# Patient Record
Sex: MALE | Race: WHITE | NOT HISPANIC OR LATINO
[De-identification: names, ages, dates, MRNs, and addresses within clinical notes are randomized per-mention and may not be internally consistent; named-entity substitution may affect disease eponyms.]

---

## 2017-01-17 ENCOUNTER — RX RENEWAL (OUTPATIENT)
Age: 82
End: 2017-01-17

## 2017-05-01 ENCOUNTER — APPOINTMENT (OUTPATIENT)
Dept: CARDIOLOGY | Facility: CLINIC | Age: 82
End: 2017-05-01

## 2017-05-01 ENCOUNTER — NON-APPOINTMENT (OUTPATIENT)
Age: 82
End: 2017-05-01

## 2017-05-01 VITALS — SYSTOLIC BLOOD PRESSURE: 125 MMHG | DIASTOLIC BLOOD PRESSURE: 85 MMHG

## 2017-05-01 VITALS
HEART RATE: 71 BPM | OXYGEN SATURATION: 95 % | TEMPERATURE: 98.1 F | SYSTOLIC BLOOD PRESSURE: 160 MMHG | DIASTOLIC BLOOD PRESSURE: 84 MMHG | BODY MASS INDEX: 23.98 KG/M2 | WEIGHT: 177 LBS | HEIGHT: 72 IN

## 2017-06-20 ENCOUNTER — RX RENEWAL (OUTPATIENT)
Age: 82
End: 2017-06-20

## 2017-09-13 ENCOUNTER — NON-APPOINTMENT (OUTPATIENT)
Age: 82
End: 2017-09-13

## 2017-09-13 ENCOUNTER — APPOINTMENT (OUTPATIENT)
Dept: CARDIOLOGY | Facility: CLINIC | Age: 82
End: 2017-09-13
Payer: MEDICARE

## 2017-09-13 VITALS
WEIGHT: 174 LBS | OXYGEN SATURATION: 97 % | SYSTOLIC BLOOD PRESSURE: 159 MMHG | DIASTOLIC BLOOD PRESSURE: 90 MMHG | HEART RATE: 74 BPM | BODY MASS INDEX: 23.6 KG/M2

## 2017-09-13 VITALS — DIASTOLIC BLOOD PRESSURE: 70 MMHG | SYSTOLIC BLOOD PRESSURE: 130 MMHG

## 2017-09-13 DIAGNOSIS — R53.1 WEAKNESS: ICD-10-CM

## 2017-09-13 PROCEDURE — 93000 ELECTROCARDIOGRAM COMPLETE: CPT

## 2017-09-13 PROCEDURE — 99214 OFFICE O/P EST MOD 30 MIN: CPT

## 2017-11-29 ENCOUNTER — NON-APPOINTMENT (OUTPATIENT)
Age: 82
End: 2017-11-29

## 2017-11-29 ENCOUNTER — APPOINTMENT (OUTPATIENT)
Dept: CARDIOLOGY | Facility: CLINIC | Age: 82
End: 2017-11-29
Payer: MEDICARE

## 2017-11-29 VITALS
WEIGHT: 176 LBS | OXYGEN SATURATION: 97 % | SYSTOLIC BLOOD PRESSURE: 166 MMHG | DIASTOLIC BLOOD PRESSURE: 78 MMHG | HEART RATE: 61 BPM | BODY MASS INDEX: 23.87 KG/M2

## 2017-11-29 DIAGNOSIS — N40.0 BENIGN PROSTATIC HYPERPLASIA WITHOUT LOWER URINARY TRACT SYMPMS: ICD-10-CM

## 2017-11-29 PROCEDURE — 99214 OFFICE O/P EST MOD 30 MIN: CPT

## 2017-11-29 PROCEDURE — 93000 ELECTROCARDIOGRAM COMPLETE: CPT

## 2017-12-08 ENCOUNTER — TRANSCRIPTION ENCOUNTER (OUTPATIENT)
Age: 82
End: 2017-12-08

## 2018-01-17 ENCOUNTER — RX RENEWAL (OUTPATIENT)
Age: 83
End: 2018-01-17

## 2018-05-20 ENCOUNTER — INPATIENT (INPATIENT)
Facility: HOSPITAL | Age: 83
LOS: 1 days | Discharge: HOME | DRG: 309 | End: 2018-05-22
Attending: INTERNAL MEDICINE | Admitting: INTERNAL MEDICINE
Payer: MEDICARE

## 2018-05-20 VITALS
OXYGEN SATURATION: 98 % | HEART RATE: 87 BPM | RESPIRATION RATE: 18 BRPM | DIASTOLIC BLOOD PRESSURE: 84 MMHG | SYSTOLIC BLOOD PRESSURE: 147 MMHG

## 2018-05-20 DIAGNOSIS — Z29.9 ENCOUNTER FOR PROPHYLACTIC MEASURES, UNSPECIFIED: ICD-10-CM

## 2018-05-20 DIAGNOSIS — R53.1 WEAKNESS: ICD-10-CM

## 2018-05-20 DIAGNOSIS — E87.2 ACIDOSIS: ICD-10-CM

## 2018-05-20 DIAGNOSIS — E83.39 OTHER DISORDERS OF PHOSPHORUS METABOLISM: ICD-10-CM

## 2018-05-20 DIAGNOSIS — I48.91 UNSPECIFIED ATRIAL FIBRILLATION: ICD-10-CM

## 2018-05-20 DIAGNOSIS — I25.10 ATHEROSCLEROTIC HEART DISEASE OF NATIVE CORONARY ARTERY WITHOUT ANGINA PECTORIS: ICD-10-CM

## 2018-05-20 DIAGNOSIS — E78.5 HYPERLIPIDEMIA, UNSPECIFIED: ICD-10-CM

## 2018-05-20 DIAGNOSIS — N40.0 BENIGN PROSTATIC HYPERPLASIA WITHOUT LOWER URINARY TRACT SYMPTOMS: ICD-10-CM

## 2018-05-20 LAB
ALBUMIN SERPL ELPH-MCNC: 3.7 G/DL — SIGNIFICANT CHANGE UP (ref 3.3–5)
ALP SERPL-CCNC: 115 U/L — SIGNIFICANT CHANGE UP (ref 40–120)
ALT FLD-CCNC: 25 U/L — SIGNIFICANT CHANGE UP (ref 10–45)
ANION GAP SERPL CALC-SCNC: 11 MMOL/L — SIGNIFICANT CHANGE UP (ref 5–17)
APTT BLD: 27.6 SEC — SIGNIFICANT CHANGE UP (ref 27.5–37.4)
AST SERPL-CCNC: 24 U/L — SIGNIFICANT CHANGE UP (ref 10–40)
BASE EXCESS BLDV CALC-SCNC: 0.4 MMOL/L — SIGNIFICANT CHANGE UP (ref -2–2)
BASOPHILS # BLD AUTO: 0 K/UL — SIGNIFICANT CHANGE UP (ref 0–0.2)
BASOPHILS NFR BLD AUTO: 0.5 % — SIGNIFICANT CHANGE UP (ref 0–2)
BILIRUB SERPL-MCNC: 0.3 MG/DL — SIGNIFICANT CHANGE UP (ref 0.2–1.2)
BUN SERPL-MCNC: 30 MG/DL — HIGH (ref 7–23)
CA-I SERPL-SCNC: 1.21 MMOL/L — SIGNIFICANT CHANGE UP (ref 1.12–1.3)
CALCIUM SERPL-MCNC: 8.9 MG/DL — SIGNIFICANT CHANGE UP (ref 8.4–10.5)
CHLORIDE BLDV-SCNC: 111 MMOL/L — HIGH (ref 96–108)
CHLORIDE SERPL-SCNC: 105 MMOL/L — SIGNIFICANT CHANGE UP (ref 96–108)
CK MB BLD-MCNC: 5.3 % — HIGH (ref 0–3.5)
CK MB CFR SERPL CALC: 5.7 NG/ML — SIGNIFICANT CHANGE UP (ref 0–6.7)
CK SERPL-CCNC: 107 U/L — SIGNIFICANT CHANGE UP (ref 30–200)
CO2 BLDV-SCNC: 26 MMOL/L — SIGNIFICANT CHANGE UP (ref 22–30)
CO2 SERPL-SCNC: 24 MMOL/L — SIGNIFICANT CHANGE UP (ref 22–31)
CREAT SERPL-MCNC: 1.26 MG/DL — SIGNIFICANT CHANGE UP (ref 0.5–1.3)
EOSINOPHIL # BLD AUTO: 0.2 K/UL — SIGNIFICANT CHANGE UP (ref 0–0.5)
EOSINOPHIL NFR BLD AUTO: 3.3 % — SIGNIFICANT CHANGE UP (ref 0–6)
GAS PNL BLDV: 139 MMOL/L — SIGNIFICANT CHANGE UP (ref 136–145)
GAS PNL BLDV: SIGNIFICANT CHANGE UP
GLUCOSE BLDV-MCNC: 205 MG/DL — HIGH (ref 70–99)
GLUCOSE SERPL-MCNC: 190 MG/DL — HIGH (ref 70–99)
HCO3 BLDV-SCNC: 25 MMOL/L — SIGNIFICANT CHANGE UP (ref 21–29)
HCT VFR BLD CALC: 49.7 % — SIGNIFICANT CHANGE UP (ref 39–50)
HCT VFR BLDA CALC: 46 % — SIGNIFICANT CHANGE UP (ref 39–50)
HGB BLD CALC-MCNC: 15.2 G/DL — SIGNIFICANT CHANGE UP (ref 13–17)
HGB BLD-MCNC: 15.9 G/DL — SIGNIFICANT CHANGE UP (ref 13–17)
INR BLD: 1.04 RATIO — SIGNIFICANT CHANGE UP (ref 0.88–1.16)
LACTATE BLDV-MCNC: 4.4 MMOL/L — CRITICAL HIGH (ref 0.7–2)
LYMPHOCYTES # BLD AUTO: 1.8 K/UL — SIGNIFICANT CHANGE UP (ref 1–3.3)
LYMPHOCYTES # BLD AUTO: 35.5 % — SIGNIFICANT CHANGE UP (ref 13–44)
MAGNESIUM SERPL-MCNC: 2.2 MG/DL — SIGNIFICANT CHANGE UP (ref 1.6–2.6)
MCHC RBC-ENTMCNC: 29.6 PG — SIGNIFICANT CHANGE UP (ref 27–34)
MCHC RBC-ENTMCNC: 31.9 GM/DL — LOW (ref 32–36)
MCV RBC AUTO: 92.7 FL — SIGNIFICANT CHANGE UP (ref 80–100)
MONOCYTES # BLD AUTO: 0.4 K/UL — SIGNIFICANT CHANGE UP (ref 0–0.9)
MONOCYTES NFR BLD AUTO: 7 % — SIGNIFICANT CHANGE UP (ref 2–14)
NEUTROPHILS # BLD AUTO: 2.8 K/UL — SIGNIFICANT CHANGE UP (ref 1.8–7.4)
NEUTROPHILS NFR BLD AUTO: 53.6 % — SIGNIFICANT CHANGE UP (ref 43–77)
OTHER CELLS CSF MANUAL: 9 ML/DL — LOW (ref 18–22)
PCO2 BLDV: 42 MMHG — SIGNIFICANT CHANGE UP (ref 35–50)
PH BLDV: 7.39 — SIGNIFICANT CHANGE UP (ref 7.35–7.45)
PHOSPHATE SERPL-MCNC: 2.1 MG/DL — LOW (ref 2.5–4.5)
PLATELET # BLD AUTO: 87 K/UL — LOW (ref 150–400)
PO2 BLDV: 25 MMHG — SIGNIFICANT CHANGE UP (ref 25–45)
POTASSIUM BLDV-SCNC: 4.3 MMOL/L — SIGNIFICANT CHANGE UP (ref 3.5–5)
POTASSIUM SERPL-MCNC: 4.3 MMOL/L — SIGNIFICANT CHANGE UP (ref 3.5–5.3)
POTASSIUM SERPL-SCNC: 4.3 MMOL/L — SIGNIFICANT CHANGE UP (ref 3.5–5.3)
PROT SERPL-MCNC: 6.2 G/DL — SIGNIFICANT CHANGE UP (ref 6–8.3)
PROTHROM AB SERPL-ACNC: 11.2 SEC — SIGNIFICANT CHANGE UP (ref 9.8–12.7)
RBC # BLD: 5.36 M/UL — SIGNIFICANT CHANGE UP (ref 4.2–5.8)
RBC # FLD: 12.3 % — SIGNIFICANT CHANGE UP (ref 10.3–14.5)
SAO2 % BLDV: 44 % — LOW (ref 67–88)
SODIUM SERPL-SCNC: 140 MMOL/L — SIGNIFICANT CHANGE UP (ref 135–145)
TROPONIN T SERPL-MCNC: <0.01 NG/ML — SIGNIFICANT CHANGE UP (ref 0–0.06)
TROPONIN T SERPL-MCNC: <0.01 NG/ML — SIGNIFICANT CHANGE UP (ref 0–0.06)
TSH SERPL-MCNC: 7 UIU/ML — HIGH (ref 0.27–4.2)
WBC # BLD: 5.2 K/UL — SIGNIFICANT CHANGE UP (ref 3.8–10.5)
WBC # FLD AUTO: 5.2 K/UL — SIGNIFICANT CHANGE UP (ref 3.8–10.5)

## 2018-05-20 PROCEDURE — 99285 EMERGENCY DEPT VISIT HI MDM: CPT | Mod: 25,GC

## 2018-05-20 PROCEDURE — 99223 1ST HOSP IP/OBS HIGH 75: CPT

## 2018-05-20 PROCEDURE — 71045 X-RAY EXAM CHEST 1 VIEW: CPT | Mod: 26

## 2018-05-20 PROCEDURE — 93010 ELECTROCARDIOGRAM REPORT: CPT

## 2018-05-20 RX ORDER — SODIUM,POTASSIUM PHOSPHATES 278-250MG
1 POWDER IN PACKET (EA) ORAL THREE TIMES A DAY
Qty: 0 | Refills: 0 | Status: DISCONTINUED | OUTPATIENT
Start: 2018-05-20 | End: 2018-05-20

## 2018-05-20 RX ORDER — SODIUM CHLORIDE 9 MG/ML
1000 INJECTION INTRAMUSCULAR; INTRAVENOUS; SUBCUTANEOUS ONCE
Qty: 0 | Refills: 0 | Status: COMPLETED | OUTPATIENT
Start: 2018-05-20 | End: 2018-05-20

## 2018-05-20 RX ORDER — SODIUM,POTASSIUM PHOSPHATES 278-250MG
1 POWDER IN PACKET (EA) ORAL
Qty: 0 | Refills: 0 | Status: COMPLETED | OUTPATIENT
Start: 2018-05-20 | End: 2018-05-20

## 2018-05-20 RX ORDER — CHOLECALCIFEROL (VITAMIN D3) 125 MCG
1000 CAPSULE ORAL DAILY
Qty: 0 | Refills: 0 | Status: DISCONTINUED | OUTPATIENT
Start: 2018-05-20 | End: 2018-05-22

## 2018-05-20 RX ORDER — APIXABAN 2.5 MG/1
5 TABLET, FILM COATED ORAL EVERY 12 HOURS
Qty: 0 | Refills: 0 | Status: DISCONTINUED | OUTPATIENT
Start: 2018-05-20 | End: 2018-05-22

## 2018-05-20 RX ORDER — TAMSULOSIN HYDROCHLORIDE 0.4 MG/1
1 CAPSULE ORAL
Qty: 0 | Refills: 0 | COMMUNITY

## 2018-05-20 RX ORDER — ASCORBIC ACID 60 MG
1 TABLET,CHEWABLE ORAL
Qty: 0 | Refills: 0 | COMMUNITY

## 2018-05-20 RX ORDER — METOPROLOL TARTRATE 50 MG
25 TABLET ORAL
Qty: 0 | Refills: 0 | Status: DISCONTINUED | OUTPATIENT
Start: 2018-05-20 | End: 2018-05-22

## 2018-05-20 RX ORDER — ATORVASTATIN CALCIUM 80 MG/1
10 TABLET, FILM COATED ORAL AT BEDTIME
Qty: 0 | Refills: 0 | Status: DISCONTINUED | OUTPATIENT
Start: 2018-05-20 | End: 2018-05-22

## 2018-05-20 RX ORDER — METOPROLOL TARTRATE 50 MG
5 TABLET ORAL ONCE
Qty: 0 | Refills: 0 | Status: COMPLETED | OUTPATIENT
Start: 2018-05-20 | End: 2018-05-20

## 2018-05-20 RX ORDER — ATORVASTATIN CALCIUM 80 MG/1
1 TABLET, FILM COATED ORAL
Qty: 0 | Refills: 0 | COMMUNITY

## 2018-05-20 RX ORDER — CHOLECALCIFEROL (VITAMIN D3) 125 MCG
1 CAPSULE ORAL
Qty: 0 | Refills: 0 | COMMUNITY

## 2018-05-20 RX ORDER — SODIUM CHLORIDE 9 MG/ML
500 INJECTION INTRAMUSCULAR; INTRAVENOUS; SUBCUTANEOUS ONCE
Qty: 0 | Refills: 0 | Status: COMPLETED | OUTPATIENT
Start: 2018-05-20 | End: 2018-05-20

## 2018-05-20 RX ORDER — APIXABAN 2.5 MG/1
2.5 TABLET, FILM COATED ORAL EVERY 12 HOURS
Qty: 0 | Refills: 0 | Status: DISCONTINUED | OUTPATIENT
Start: 2018-05-20 | End: 2018-05-20

## 2018-05-20 RX ORDER — TAMSULOSIN HYDROCHLORIDE 0.4 MG/1
0.4 CAPSULE ORAL DAILY
Qty: 0 | Refills: 0 | Status: DISCONTINUED | OUTPATIENT
Start: 2018-05-20 | End: 2018-05-22

## 2018-05-20 RX ORDER — METOPROLOL TARTRATE 50 MG
25 TABLET ORAL
Qty: 0 | Refills: 0 | Status: DISCONTINUED | OUTPATIENT
Start: 2018-05-20 | End: 2018-05-20

## 2018-05-20 RX ORDER — ASPIRIN/CALCIUM CARB/MAGNESIUM 324 MG
81 TABLET ORAL DAILY
Qty: 0 | Refills: 0 | Status: DISCONTINUED | OUTPATIENT
Start: 2018-05-20 | End: 2018-05-21

## 2018-05-20 RX ADMIN — Medication 1000 UNIT(S): at 13:40

## 2018-05-20 RX ADMIN — SODIUM CHLORIDE 1000 MILLILITER(S): 9 INJECTION INTRAMUSCULAR; INTRAVENOUS; SUBCUTANEOUS at 07:49

## 2018-05-20 RX ADMIN — SODIUM CHLORIDE 1000 MILLILITER(S): 9 INJECTION INTRAMUSCULAR; INTRAVENOUS; SUBCUTANEOUS at 08:53

## 2018-05-20 RX ADMIN — Medication 5 MILLIGRAM(S): at 09:24

## 2018-05-20 RX ADMIN — Medication 81 MILLIGRAM(S): at 13:39

## 2018-05-20 RX ADMIN — APIXABAN 5 MILLIGRAM(S): 2.5 TABLET, FILM COATED ORAL at 17:41

## 2018-05-20 RX ADMIN — Medication 1 TABLET(S): at 15:35

## 2018-05-20 RX ADMIN — Medication 1 TABLET(S): at 13:39

## 2018-05-20 RX ADMIN — Medication 25 MILLIGRAM(S): at 15:36

## 2018-05-20 RX ADMIN — TAMSULOSIN HYDROCHLORIDE 0.4 MILLIGRAM(S): 0.4 CAPSULE ORAL at 13:40

## 2018-05-20 RX ADMIN — Medication 1 TABLET(S): at 17:41

## 2018-05-20 RX ADMIN — APIXABAN 2.5 MILLIGRAM(S): 2.5 TABLET, FILM COATED ORAL at 09:33

## 2018-05-20 RX ADMIN — ATORVASTATIN CALCIUM 10 MILLIGRAM(S): 80 TABLET, FILM COATED ORAL at 21:04

## 2018-05-20 NOTE — ED PROVIDER NOTE - PHYSICAL EXAMINATION
Physical Exam: aletha ROJO who is in NAD, AAOx3, NCAT, + dry mucosal membranes, neck is supple, PERRL, CTAB, + tachycardic (mild) and irregular rhythm, abdomen is soft and NTND, No edema, No deformity of extremities, No rashes, CN grossly intact, No focal motor or sensory deficits. Rectal (chaperone Miriam Herron RN) was brown soft but formed stool that was hemoccult negative. ~ Dante Azul MD

## 2018-05-20 NOTE — ED PROVIDER NOTE - PSH
Basal cell cancer  excised from face 5/12  Cataract  bilateral lens implant 1/12  H/O angioplasty  1 stent placed 2002  H/O hernia repair  1998  History of hip replacement, total  right 2008  S/P appendectomy    S/P arthroscopy  left knee 1998

## 2018-05-20 NOTE — ED PROVIDER NOTE - NS ED ROS FT
No fever, no chills, no change in vision, no change in hearing, no chest pain, no shortness of breath, no abdominal pain, no vomiting, no dysuria, no muscle pain, no rashes, no loss of consciousness, + gen weakness. ~ Dante Azul MD

## 2018-05-20 NOTE — H&P ADULT - PROBLEM SELECTOR PLAN 4
lactate 4.4 likely due to transient hypoperfusion during Afib with RVR  s/p 1.5L IVF by ER  repeat lactate now lactate 4.4 likely due to transient hypoperfusion during Afib with RVR  s/p 1.5L IVF by ER  repeat lactate now, if still elevated additional IVF can be considered

## 2018-05-20 NOTE — H&P ADULT - NEGATIVE CARDIOVASCULAR SYMPTOMS
no palpitations/no peripheral edema/no chest pain/no dyspnea on exertion/no paroxysmal nocturnal dyspnea

## 2018-05-20 NOTE — ED PROVIDER NOTE - OBJECTIVE STATEMENT
Dante Azul MD (resident): 89 M w/ Hx of CAD s/p PCI 2002, paroxysmal AF and PE 2/2 L TKA 2 yrs ago (no longer on coumadin), HTN, BPH, who was BIBEMS for generalized weakness. The patient started to feel "unwell" approximately at 6 am when he woke up, able to walk to bathroom and urinate and have a BM. Pt felt worsening weakness and malaise after having a second soft stool (nonbloody, no dark tarry stool). Improved after he drank OJ and ate sandwich. Pt had check up 2 wks ago, routine physical, felt normal, but had "irregularly irregular rhythm" and was supposed to f/u w/ cardiologist. No CP, SOB, lightheadedness, presyncope.

## 2018-05-20 NOTE — ED ADULT TRIAGE NOTE - ADDITIONAL SAFETY/BANDS...
----- Message from Bharathi Pope DO sent at 12/1/2017  1:12 PM EST -----  Please inform the blood testing revealed no abnoramlities. She is going to be discharged back to her PCP for treatment of her Underlying fibromyalgia. Recommendations for the hip and back pain have been provided to her PCP. Additional Safety/Bands:

## 2018-05-20 NOTE — H&P ADULT - PROBLEM SELECTOR PLAN 2
-had Afib with RVR on EKG and on tele, s/p IV metoprolol 5mg IV by ER  -100-120s during my exam but asymptomatic, stable BP  -on metoprolol tartrate 25mg once daily at home, will increase to 25mg bid for now, may need uptitration to 50mg bid  -monitor on tele  -[id number] score is 2 by age only. Extensive Discussion regarding R/B/A of anticoagulation with patient and female  bedside. Denies any history of bleeding, remote history of 1 fall in past year otherwise no history of falls and ambulates with walker. Explained to them risk of bleeding is higher as patient is on baby aspirin for CAD and if he incurs a fall or trauma to head, life threatening irreversible intracranial bleeding is possible, also spontaneous internal bleeding also a risk vs not being on a/c and having a higher risk of CVA. Other r/b/a were also discussed not listed here. Dr mcgrath instructed ER to start eliquis 2.5mg bid, I confirmed with Patient and  who agreed to anticoagulation with eliquis at this time and understood all r/b/a and wanted to proceed.  -TTE   -cardio consult with Dr Valenzuela -had Afib with RVR on EKG and on tele, s/p IV metoprolol 5mg IV by ER  -100-120s during my exam but asymptomatic, stable BP  -on metoprolol tartrate 25mg once daily at home, will increase to 25mg bid for now, may need uptitration to 50mg bid  -monitor on tele  -[id number] score is 2 by age only. Extensive Discussion regarding R/B/A of anticoagulation with patient and female  bedside. Denies any history of bleeding, remote history of 1 fall in past year otherwise no history of falls and ambulates with walker. Explained to them risk of bleeding is higher as patient is on baby aspirin for CAD and if he incurs a fall or trauma to head, life threatening irreversible intracranial bleeding is possible, also spontaneous internal bleeding also a risk vs not being on a/c and having a higher risk of CVA. Other r/b/a were also discussed not listed here. Dr mcgrath instructed ER to start eliquis 2.5mg bid, I confirmed with Patient and  who agreed to anticoagulation with eliquis at this time and understood all r/b/a and wanted to proceed.  -TTE   -cardio consult with Dr Woods -had Afib with RVR on EKG and on tele, s/p IV metoprolol 5mg IV by ER  -100-120s during my exam but asymptomatic, stable BP  -on metoprolol tartrate 25mg once daily at home, will increase to 25mg bid for now, may need uptitration to 50mg bid  -monitor on tele  -[id number]-VUDa score is 2 by age only. Extensive Discussion regarding R/B/A of anticoagulation with patient and female  bedside. Denies any history of bleeding, remote history of 1 fall in past year otherwise no history of falls and ambulates with walker. Explained to them risk of bleeding is higher as patient is on baby aspirin for CAD and if he incurs a fall or trauma to head, life threatening irreversible intracranial bleeding is possible, also spontaneous internal bleeding also a risk vs not being on a/c and having a higher risk of CVA. Other r/b/a were also discussed not listed here. Dr mcgrath instructed ER to start eliquis 2.5mg bid, I confirmed with Patient and  who agreed to anticoagulation with eliquis at this time and understood all r/b/a and wanted to proceed.  -TTE   -tsh pending  -cardio consult with Dr Woods, discussed with Dr Woods agrees with starting a/c will see patient molly

## 2018-05-20 NOTE — H&P ADULT - NSHPATTENDINGPLANDISCUSS_GEN_ALL_CORE
ER NP Ian and female " at bedside". All further care presumed by Dr Johnston. ER VANCE Sosa and female "" at bedside. All further care presumed by Dr Johnston.

## 2018-05-20 NOTE — H&P ADULT - PROBLEM SELECTOR PLAN 1
generalized weakness in setting of Afib with RVR now completely resolved per patient. Unlikely ACS, No s/s of infectious process, lactate likely elevated due to transient hypoperfusion in setting of RVR.  -will r/o ACS, 1st trop negative, no acute ischemic EKG changes, trend cardiac enzymes  -treat Afib with RVR generalized weakness in setting of Afib with RVR now completely resolved per patient. Unlikely ACS, No s/s of infectious process, lactate likely elevated due to transient hypoperfusion in setting of RVR.  -will r/o ACS, 1st trop negative, no acute ischemic EKG changes, trend cardiac enzymes  -treat Afib with RVR as below

## 2018-05-20 NOTE — ED ADULT NURSE NOTE - OBJECTIVE STATEMENT
89 year old male presents to the ED complaining of weakness. As per patient he had to go to the bathroom this morning and 'I was so weak I couldn't get up off the toilet. Pt has hx of afib 2 years ago following knee surgery. Pt was on coumadin and taken off when the afib resolved. As per patient he saw his MD last week and was told he had a 'irregular rhythm' and he needed to follow up with his cardiologist. Pt has soft stool this morning, incontinence care provided. Pt states that his weakness resolved following some juice and a sandwich. Ekg completed and given to attending, Pt placed on CM showing afib. Pt is A&O x 4, VSS with elevated HR, afebrile, ambulating independently. Pt denies fever, chills, NVD, SOB, or chest pain. Pt skin CDI. Pt has left sided leg swelling, as per patient, 'I've had that for years'.

## 2018-05-20 NOTE — H&P ADULT - ASSESSMENT
89 M w/ Hx of CAD s/p PCI 2002, paroxysmal AF not on a/c,  provoked PE s/p L TKR  (no longer on coumadin), HTN, BPH, who was BIBEMS for generalized weakness found to be in Afib with RVR also with lactic acidosis

## 2018-05-20 NOTE — H&P ADULT - HISTORY OF PRESENT ILLNESS
89 M w/ Hx of CAD s/p PCI 2002, paroxysmal AF and provoked PE 2/2 L TKA 2 yrs ago (no longer on coumadin), HTN, BPH, who was BIBEMS for generalized weakness.  patient started to feel "unwell" approximately at 6 am when he woke up, able to walk to bathroom and urinate and have a BM. Pt felt worsening weakness and malaise after having a second normal BM, no melena or hematochezia. Denies any abd pain, n/v, chest pain,.      ED vitals: 97.1, HR 73- 133, /83, 16, 100% on RA  He was given metoprolol 5mg IVP and NS 1.5L bolus in ER 89 M w/ Hx of CAD s/p PCI 2002, paroxysmal AF not on a/c,  provoked PE s/p L TKR  (no longer on coumadin), HTN, BPH, who was BIBEMS for generalized weakness. This AM around 6AM while in the bathroom patient started to feel very weak. He had 2 soft BMs, no melena or hematochezia. He then could not get up from toilet because he felt so weak and flushed. Denied any chest pain, sob, palpitations, syncope, lightheadedness n/v. Currently weakness has completely resolved and was never focal. Denies any f/c/cough/dysuria, URI symptoms. Saw his PMD 2 weeks ago and was told he has an irregular heart beat so was told to see his cardiologist which he had an appointment for this Monday. Denies any history of bleeding including GI bleed. Reports 1 fall in past year but usually does not have falls and ambulates with a cane.    ED vitals: 97.1, HR 73- 133, /83, 16, 100% on RA  He was given metoprolol 5mg IVP and NS 1.5L bolus in ER

## 2018-05-20 NOTE — ED PROVIDER NOTE - PROGRESS NOTE DETAILS
Dante Azul MD (resident): endorsed to Dr. Johnston, who requested Eliquis for A/C. 2.5 mg dose was chosen due to pt's age. Chadsvasc of 4.

## 2018-05-20 NOTE — ED PROVIDER NOTE - PMH
BPH (Benign Prostatic Hyperplasia)    CAD (coronary artery disease)  MI 2002  H/O spinal stenosis    Hyperlipidemia    Urinary retention  to ER for catheriztion 2009

## 2018-05-20 NOTE — ED PROVIDER NOTE - MEDICAL DECISION MAKING DETAILS
Dante Azul MD (resident): gen weakness w/ associated AFib (hx of afib, on metoprolol 25 mg daily). no associated anginal symptoms. will screen for electrolyte abnormalities, ACS. though pt w/ Hx of PE, no CP or SOB, no hypoxia, not likely due to PE. rectal exam negative for blood. Will trial IVF hydration for MMM and mild dehydration prior to rate controlling pt. 89 y old m  gen weakness w/ associated AFib (hx of afib, on metoprolol 25 mg daily). no associated anginal symptoms. feels better since came to ER , NO chest pain ,couth or fever ,rectal neg for blood   will screen for electrolyte abnormalities, ACS. though pt w/ Hx of PE, no CP or SOB, no hypoxia, not likely due to PE.  . Will trial IVF hydration for MMM and mild dehydration prior to rate controlling pt.ZR

## 2018-05-21 ENCOUNTER — APPOINTMENT (OUTPATIENT)
Dept: CARDIOLOGY | Facility: CLINIC | Age: 83
End: 2018-05-21

## 2018-05-21 LAB
ANION GAP SERPL CALC-SCNC: 9 MMOL/L — SIGNIFICANT CHANGE UP (ref 5–17)
BUN SERPL-MCNC: 28 MG/DL — HIGH (ref 7–23)
CALCIUM SERPL-MCNC: 8.6 MG/DL — SIGNIFICANT CHANGE UP (ref 8.4–10.5)
CHLORIDE SERPL-SCNC: 109 MMOL/L — HIGH (ref 96–108)
CO2 SERPL-SCNC: 25 MMOL/L — SIGNIFICANT CHANGE UP (ref 22–31)
CREAT SERPL-MCNC: 1.18 MG/DL — SIGNIFICANT CHANGE UP (ref 0.5–1.3)
GLUCOSE SERPL-MCNC: 103 MG/DL — HIGH (ref 70–99)
HCT VFR BLD CALC: 47.8 % — SIGNIFICANT CHANGE UP (ref 39–50)
HGB BLD-MCNC: 15.4 G/DL — SIGNIFICANT CHANGE UP (ref 13–17)
MAGNESIUM SERPL-MCNC: 2.2 MG/DL — SIGNIFICANT CHANGE UP (ref 1.6–2.6)
MCHC RBC-ENTMCNC: 29.3 PG — SIGNIFICANT CHANGE UP (ref 27–34)
MCHC RBC-ENTMCNC: 32.2 GM/DL — SIGNIFICANT CHANGE UP (ref 32–36)
MCV RBC AUTO: 90.9 FL — SIGNIFICANT CHANGE UP (ref 80–100)
PHOSPHATE SERPL-MCNC: 2.9 MG/DL — SIGNIFICANT CHANGE UP (ref 2.5–4.5)
PLATELET # BLD AUTO: 100 K/UL — LOW (ref 150–400)
POTASSIUM SERPL-MCNC: 4.1 MMOL/L — SIGNIFICANT CHANGE UP (ref 3.5–5.3)
POTASSIUM SERPL-SCNC: 4.1 MMOL/L — SIGNIFICANT CHANGE UP (ref 3.5–5.3)
RBC # BLD: 5.26 M/UL — SIGNIFICANT CHANGE UP (ref 4.2–5.8)
RBC # FLD: 13.8 % — SIGNIFICANT CHANGE UP (ref 10.3–14.5)
SODIUM SERPL-SCNC: 143 MMOL/L — SIGNIFICANT CHANGE UP (ref 135–145)
T3FREE SERPL-MCNC: 2.75 PG/ML — SIGNIFICANT CHANGE UP (ref 1.8–4.6)
T4 FREE SERPL-MCNC: 1.1 NG/DL — SIGNIFICANT CHANGE UP (ref 0.9–1.8)
WBC # BLD: 6.21 K/UL — SIGNIFICANT CHANGE UP (ref 3.8–10.5)
WBC # FLD AUTO: 6.21 K/UL — SIGNIFICANT CHANGE UP (ref 3.8–10.5)

## 2018-05-21 PROCEDURE — 99222 1ST HOSP IP/OBS MODERATE 55: CPT | Mod: AI

## 2018-05-21 RX ORDER — BACITRACIN ZINC 500 UNIT/G
1 OINTMENT IN PACKET (EA) TOPICAL EVERY 8 HOURS
Qty: 0 | Refills: 0 | Status: DISCONTINUED | OUTPATIENT
Start: 2018-05-21 | End: 2018-05-22

## 2018-05-21 RX ADMIN — APIXABAN 5 MILLIGRAM(S): 2.5 TABLET, FILM COATED ORAL at 05:45

## 2018-05-21 RX ADMIN — Medication 1000 UNIT(S): at 11:07

## 2018-05-21 RX ADMIN — TAMSULOSIN HYDROCHLORIDE 0.4 MILLIGRAM(S): 0.4 CAPSULE ORAL at 11:07

## 2018-05-21 RX ADMIN — Medication 1 APPLICATION(S): at 21:55

## 2018-05-21 RX ADMIN — ATORVASTATIN CALCIUM 10 MILLIGRAM(S): 80 TABLET, FILM COATED ORAL at 21:55

## 2018-05-21 RX ADMIN — Medication 1 TABLET(S): at 11:07

## 2018-05-21 RX ADMIN — Medication 25 MILLIGRAM(S): at 17:23

## 2018-05-21 RX ADMIN — Medication 1 APPLICATION(S): at 05:45

## 2018-05-21 RX ADMIN — Medication 25 MILLIGRAM(S): at 05:45

## 2018-05-21 RX ADMIN — Medication 1 APPLICATION(S): at 14:57

## 2018-05-21 RX ADMIN — APIXABAN 5 MILLIGRAM(S): 2.5 TABLET, FILM COATED ORAL at 17:23

## 2018-05-21 NOTE — PHYSICAL THERAPY INITIAL EVALUATION ADULT - ADDITIONAL COMMENTS
Pt states he lives with his significant other Jes in a private house with a ramp to enter, 4 steps without HRs or 4 steps with HRs to enter. Pt states he has a flight of stairs to go down inside, +HR. Pt states he uses a cane for ambulation. Pt states his significant other is available to assist when needed. Pt states he is independent with all ADLs and ambulation.

## 2018-05-21 NOTE — PHYSICAL THERAPY INITIAL EVALUATION ADULT - BALANCE TRAINING, PT EVAL
GOAL: Patient will demonstrate improved static/dynamic balance to good, in order to improve stability, decrease fall risk and increase independence with ADLs within 2 weeks.

## 2018-05-21 NOTE — PROGRESS NOTE ADULT - PROBLEM SELECTOR PLAN 4
lactate 4.4 likely due to transient hypoperfusion during Afib with RVR  s/p 1.5L IVF by ER  repeat lactate now, if still elevated additional IVF can be considered

## 2018-05-21 NOTE — PHYSICAL THERAPY INITIAL EVALUATION ADULT - PERTINENT HX OF CURRENT PROBLEM, REHAB EVAL
Pt is a 89 y.o. male w/ Hx of CAD s/p PCI 2002, paroxysmal AF and provoked PE 2/2 L TKA 2 yrs ago (no longer on coumadin), HTN, BPH, who was BIBEMS for generalized weakness.  patient started to feel "unwell" approximately at 6 am when he woke up, able to walk to bathroom and urinate and have a BM. Pt felt worsening weakness and malaise after having a second normal BM, no melena or hematochezia. Denies any abd pain, n/v, chest pain. (-) CXR.

## 2018-05-21 NOTE — CONSULT NOTE ADULT - ASSESSMENT
Patient has developed atrial fibrillation some time over the winter.  Has no palpitations and is unclear if symptoms yesterday were due to his atrial fibrillation.  Enzymes are negative and is no evidence of an acute cardiac event.  He has no current symptoms.  Agree that he needs anticoagulation and continue eliquis.  Will probably need about 100 mg of Metoprolol/day to control rate.  Should have repeat echo.  Probably will not need prolonged hospitalization.    DARREL Woods  [phone]

## 2018-05-21 NOTE — PROGRESS NOTE ADULT - SUBJECTIVE AND OBJECTIVE BOX
Patient is a 89y old  Male who presents with a chief complaint of generalized weakness (20 May 2018 10:52)      SUBJECTIVE / OVERNIGHT EVENTS:    no CP, SOB, or palpitations      Vital Signs Last 24 Hrs  T(C): 36.3 (21 May 2018 04:55), Max: 36.9 (20 May 2018 11:52)  T(F): 97.4 (21 May 2018 04:55), Max: 98.4 (20 May 2018 11:52)  HR: 84 (21 May 2018 04:55) (80 - 93)  BP: 126/86 (21 May 2018 04:55) (105/72 - 139/77)  BP(mean): --  RR: 18 (21 May 2018 04:55) (14 - 18)  SpO2: 98% (21 May 2018 04:55) (97% - 100%)  I&O's Summary    20 May 2018 07:01  -  21 May 2018 07:00  --------------------------------------------------------  IN: 240 mL / OUT: 200 mL / NET: 40 mL        GENERAL: NAD, AAOx3  HEAD:  Atraumatic, Normocephalic  EYES: EOMI, PERRLA, conjunctiva and sclera clear  NECK: Supple, No JVD, No LAD  CHEST/LUNG: Clear to auscultation bilaterally; No wheeze  HEART: Irregular rate and rhythm; No murmurs, rubs, or gallops  ABDOMEN: Soft, Nontender, Nondistended; Bowel sounds present  EXTREMITIES:  2+ Peripheral Pulses, No clubbing, cyanosis, or edema  SKIN: No rashes or lesions  NEURO: nonfocal CN/motor/sensory/reflexes    LABS:                        15.4   6.21  )-----------( 100      ( 21 May 2018 07:55 )             47.8     05-21    143  |  109<H>  |  28<H>  ----------------------------<  103<H>  4.1   |  25  |  1.18    Ca    8.6      21 May 2018 06:37  Phos  2.9     05-21  Mg     2.2     05-21    TPro  6.2  /  Alb  3.7  /  TBili  0.3  /  DBili  x   /  AST  24  /  ALT  25  /  AlkPhos  115  05-20    PT/INR - ( 20 May 2018 07:45 )   PT: 11.2 sec;   INR: 1.04 ratio         PTT - ( 20 May 2018 07:45 )  PTT:27.6 sec  CAPILLARY BLOOD GLUCOSE        CARDIAC MARKERS ( 20 May 2018 12:29 )  x     / <0.01 ng/mL / 107 U/L / x     / 5.7 ng/mL  CARDIAC MARKERS ( 20 May 2018 07:45 )  x     / <0.01 ng/mL / x     / x     / x              RADIOLOGY & ADDITIONAL TESTS:    Imaging Personally Reviewed:  [x] YES  [ ] NO    Consultant(s) Notes Reviewed:  [x] YES  [ ] NO      MEDICATIONS  (STANDING):  apixaban 5 milliGRAM(s) Oral every 12 hours  aspirin enteric coated 81 milliGRAM(s) Oral daily  atorvastatin 10 milliGRAM(s) Oral at bedtime  BACItracin   Ointment 1 Application(s) Topical every 8 hours  cholecalciferol 1000 Unit(s) Oral daily  metoprolol tartrate 25 milliGRAM(s) Oral two times a day  multivitamin 1 Tablet(s) Oral daily  tamsulosin 0.4 milliGRAM(s) Oral daily  vitamin B complex with vitamin C 1 Tablet(s) Oral daily    MEDICATIONS  (PRN):      Care Discussed with Consultants/Other Providers [x] YES  [ ] NO    HEALTH ISSUES - PROBLEM Dx:  Hypophosphatemia: Hypophosphatemia  Need for prophylactic measure: Need for prophylactic measure  Hyperlipidemia: Hyperlipidemia  BPH (Benign Prostatic Hyperplasia): BPH (Benign Prostatic Hyperplasia)  Lactic acid increased: Lactic acid increased  CAD (coronary artery disease): CAD (coronary artery disease)  Atrial fibrillation with RVR: Atrial fibrillation with RVR  Generalized weakness: Generalized weakness

## 2018-05-21 NOTE — PHYSICAL THERAPY INITIAL EVALUATION ADULT - PLANNED THERAPY INTERVENTIONS, PT EVAL
gait training/balance training/strengthening/GOAL: Stair Negotiation Training: Patient will be able to negotiate up & down 1 flight of stairs with bilateral rails, step to gait pattern, in 2 weeks./transfer training

## 2018-05-21 NOTE — PHYSICAL THERAPY INITIAL EVALUATION ADULT - STRENGTHENING, PT EVAL
GOAL: Patient will improve bilateral UE/LE strength to 5/5, for increased limb stability, to improve gait and facilitate stair negotiation in 2 weeks.

## 2018-05-21 NOTE — CONSULT NOTE ADULT - SUBJECTIVE AND OBJECTIVE BOX
Patient seen and evaluated @   Chief Complaint:     HPI:  89 M w/ Hx of CAD s/p PCI , paroxysmal AF not on a/c,  provoked PE s/p L TKR  (no longer on coumadin), HTN, BPH, who was BIBEMS for generalized weakness. This AM around 6AM while in the bathroom patient started to feel very weak. He had 2 soft BMs, no melena or hematochezia. He then could not get up from toilet because he felt so weak and flushed. Denied any chest pain, sob, palpitations, syncope, lightheadedness n/v. Currently weakness has completely resolved and was never focal. Denies any f/c/cough/dysuria, URI symptoms. Saw his PMD 2 weeks ago and was told he has an irregular heart beat so was told to see his cardiologist which he had an appointment for this Monday. Denies any history of bleeding including GI bleed. Reports 1 fall in past year but usually does not have falls and ambulates with a cane.    Cardiology History as above.  He has history of CAD with anterior wall infarct with thrombolysis and subsequent PTCA in , but has not had any subsequent ischemic events or angina.  He had 1 week monitoring in 2016 (after the a fib with PE after surgery) and no atrial fibrillation seen then or in any other office visits.  He has not had any palpitations, but apparently was in atrial fibrillation when recently seen at his internist's office.  He feels fine at present and has no palpitations or dyspnea    ED vitals: 97.1, HR 73- 133, /83, 16, 100% on RA  He was given metoprolol 5mg IVP and NS 1.5L bolus in ER (20 May 2018 10:52)    PMH:   BPH (Benign Prostatic Hyperplasia)  Urinary retention  H/O hernia repair  CAD (coronary artery disease)  Hyperlipidemia  H/O spinal stenosis    PSH:   Cataract  Basal cell cancer  H/O hernia repair  History of hip replacement, total  S/P arthroscopy  S/P appendectomy  H/O angioplasty    Medications:   apixaban 5 milliGRAM(s) Oral every 12 hours  aspirin enteric coated 81 milliGRAM(s) Oral daily  atorvastatin 10 milliGRAM(s) Oral at bedtime  BACItracin   Ointment 1 Application(s) Topical every 8 hours  cholecalciferol 1000 Unit(s) Oral daily  metoprolol tartrate 25 milliGRAM(s) Oral two times a day  multivitamin 1 Tablet(s) Oral daily  tamsulosin 0.4 milliGRAM(s) Oral daily  vitamin B complex with vitamin C 1 Tablet(s) Oral daily    Allergies:  penicillin (Unknown)    FAMILY HISTORY:  No pertinent family history in first degree relatives    Social History:  Smoking:  Alcohol:  Drugs:    Review of Systems:  Constitutional: x[ ] Fever [ x Chills [x ] Fatigue [ ] Weight change   HEENT: [x ] Blurred vision [ ] Eye Pain [ ] Headache [ ] Runny nose [ ] Sore Throat   Respiratory: [ ] Cough [ ] Wheezing [ ] Shortness of breath  Cardiovascular: [ ] Chest Pain [ ] Palpitations [x ] BAEZ [ ] PND [ ] Orthopnea  Gastrointestinal: [ ] Abdominal Pain [ ] Diarrhea [ ] Constipation [ ] Hemorrhoids [ ] Nausea [ ] Vomiting  Genitourinary: [ ] Nocturia [ ] Dysuria [ ] Incontinence  Extremities: [ ] Swelling [ ] Joint Pain  Neurologic: [ ] Focal deficit [ ] Paresthesias [ ] Syncope  Lymphatic: [ ] Swelling [ ] Lymphadenopathy   Skin: [ ] Rash [ ] Ecchymoses [ ] Wounds [ ] Lesions  Psychiatry: [ ] Depression [ ] Suicidal/Homicidal Ideation [ ] Anxiety [ ] Sleep Disturbances  [ ] 10 point review of systems is otherwise negative except as mentioned above            [ ]Unable to obtain    Physical Exam:  T(C): 36.3 (18 @ 04:55), Max: 36.9 (18 @ 11:52)  HR: 84 (18 @ 04:55) (80 - 133)  BP: 126/86 (18 @ 04:55) (105/72 - 139/77)  RR: 18 (18 @ 04:55) (14 - 18)  SpO2: 98% (18 @ 04:55) (97% - 100%)  Wt(kg): --     @ 07:01  -   @ 07:00  --------------------------------------------------------  IN: 240 mL / OUT: 200 mL / NET: 40 mL      Daily Height in cm: 182.88 (20 May 2018 16:02)    Daily Weight in k.1 (20 May 2018 13:53)    Appearance: [x ] Normal [ ] NAD  Eyes: [ x] PERRL [ ] EOMI  HENT: [x ] Normal oral [place] [ ]NC/AT  Cardiovascular: [ x] S1 [x ] S2 [ ] irregular about 100  Procedural Access Site: [ ] No hematoma [ ] Non-tender to palpation [ ] 2+ pulse [ ] No bruit [ ] No Ecchymosis  Respiratory: [ ] Clear to auscultation bilaterally  Gastrointestinal: [ x] Soft [ ] Non-tender [ ] Non-distended [ ] BS+  Musculoskeletal: [ x] No clubbing [ ] No joint deformity   Neurologic: [x ] Non-focal  Lymphatic: [ x] No lymphadenopathy  Psychiatry: [x ] AAOx3 [ ] Mood & affect appropriate  Skin: [ ] No rashes [ ] No ecchymoses [ ] No cyanosis    Cardiovascular Diagnostic Testing:  ECG:    Echo:    Stress Testing:    Cath:    Interpretation of Telemetry:    Imaging:    Labs:                        15.9   5.2   )-----------( 87       ( 20 May 2018 07:45 )             49.7     05-21    143  |  109<H>  |  28<H>  ----------------------------<  103<H>  4.1   |  25  |  1.18    Ca    8.6      21 May 2018 06:37  Phos  2.9     -  Mg     2.2     -    TPro  6.2  /  Alb  3.7  /  TBili  0.3  /  DBili  x   /  AST  24  /  ALT  25  /  AlkPhos  115  05-20    PT/INR - ( 20 May 2018 07:45 )   PT: 11.2 sec;   INR: 1.04 ratio         PTT - ( 20 May 2018 07:45 )  PTT:27.6 sec  CARDIAC MARKERS ( 20 May 2018 12:29 )  x     / <0.01 ng/mL / 107 U/L / x     / 5.7 ng/mL  CARDIAC MARKERS ( 20 May 2018 07:45 )  x     / <0.01 ng/mL / x     / x     / x                Thyroid Stimulating Hormone, Serum: 7.00 uIU/mL ( @ 11:40)

## 2018-05-22 ENCOUNTER — CHART COPY (OUTPATIENT)
Age: 83
End: 2018-05-22

## 2018-05-22 ENCOUNTER — TRANSCRIPTION ENCOUNTER (OUTPATIENT)
Age: 83
End: 2018-05-22

## 2018-05-22 VITALS — WEIGHT: 180.78 LBS

## 2018-05-22 LAB
ANION GAP SERPL CALC-SCNC: 10 MMOL/L — SIGNIFICANT CHANGE UP (ref 5–17)
BUN SERPL-MCNC: 27 MG/DL — HIGH (ref 7–23)
CALCIUM SERPL-MCNC: 8.9 MG/DL — SIGNIFICANT CHANGE UP (ref 8.4–10.5)
CHLORIDE SERPL-SCNC: 111 MMOL/L — HIGH (ref 96–108)
CO2 SERPL-SCNC: 25 MMOL/L — SIGNIFICANT CHANGE UP (ref 22–31)
CREAT SERPL-MCNC: 1.31 MG/DL — HIGH (ref 0.5–1.3)
GLUCOSE SERPL-MCNC: 95 MG/DL — SIGNIFICANT CHANGE UP (ref 70–99)
HCT VFR BLD CALC: 47.6 % — SIGNIFICANT CHANGE UP (ref 39–50)
HGB BLD-MCNC: 15.8 G/DL — SIGNIFICANT CHANGE UP (ref 13–17)
MCHC RBC-ENTMCNC: 30.4 PG — SIGNIFICANT CHANGE UP (ref 27–34)
MCHC RBC-ENTMCNC: 33.2 GM/DL — SIGNIFICANT CHANGE UP (ref 32–36)
MCV RBC AUTO: 91.5 FL — SIGNIFICANT CHANGE UP (ref 80–100)
PLATELET # BLD AUTO: 105 K/UL — LOW (ref 150–400)
POTASSIUM SERPL-MCNC: 4.4 MMOL/L — SIGNIFICANT CHANGE UP (ref 3.5–5.3)
POTASSIUM SERPL-SCNC: 4.4 MMOL/L — SIGNIFICANT CHANGE UP (ref 3.5–5.3)
RBC # BLD: 5.2 M/UL — SIGNIFICANT CHANGE UP (ref 4.2–5.8)
RBC # FLD: 13.8 % — SIGNIFICANT CHANGE UP (ref 10.3–14.5)
SODIUM SERPL-SCNC: 146 MMOL/L — HIGH (ref 135–145)
WBC # BLD: 6.97 K/UL — SIGNIFICANT CHANGE UP (ref 3.8–10.5)
WBC # FLD AUTO: 6.97 K/UL — SIGNIFICANT CHANGE UP (ref 3.8–10.5)

## 2018-05-22 PROCEDURE — 85730 THROMBOPLASTIN TIME PARTIAL: CPT

## 2018-05-22 PROCEDURE — 83735 ASSAY OF MAGNESIUM: CPT

## 2018-05-22 PROCEDURE — 85610 PROTHROMBIN TIME: CPT

## 2018-05-22 PROCEDURE — 93010 ELECTROCARDIOGRAM REPORT: CPT

## 2018-05-22 PROCEDURE — 99232 SBSQ HOSP IP/OBS MODERATE 35: CPT

## 2018-05-22 PROCEDURE — 84484 ASSAY OF TROPONIN QUANT: CPT

## 2018-05-22 PROCEDURE — 80048 BASIC METABOLIC PNL TOTAL CA: CPT

## 2018-05-22 PROCEDURE — 82330 ASSAY OF CALCIUM: CPT

## 2018-05-22 PROCEDURE — 82553 CREATINE MB FRACTION: CPT

## 2018-05-22 PROCEDURE — 84132 ASSAY OF SERUM POTASSIUM: CPT

## 2018-05-22 PROCEDURE — 82550 ASSAY OF CK (CPK): CPT

## 2018-05-22 PROCEDURE — 82803 BLOOD GASES ANY COMBINATION: CPT

## 2018-05-22 PROCEDURE — 99285 EMERGENCY DEPT VISIT HI MDM: CPT | Mod: 25

## 2018-05-22 PROCEDURE — 71045 X-RAY EXAM CHEST 1 VIEW: CPT

## 2018-05-22 PROCEDURE — 80053 COMPREHEN METABOLIC PANEL: CPT

## 2018-05-22 PROCEDURE — 82947 ASSAY GLUCOSE BLOOD QUANT: CPT

## 2018-05-22 PROCEDURE — 93005 ELECTROCARDIOGRAM TRACING: CPT

## 2018-05-22 PROCEDURE — 82435 ASSAY OF BLOOD CHLORIDE: CPT

## 2018-05-22 PROCEDURE — 84443 ASSAY THYROID STIM HORMONE: CPT

## 2018-05-22 PROCEDURE — 97162 PT EVAL MOD COMPLEX 30 MIN: CPT

## 2018-05-22 PROCEDURE — 85027 COMPLETE CBC AUTOMATED: CPT

## 2018-05-22 PROCEDURE — 96374 THER/PROPH/DIAG INJ IV PUSH: CPT

## 2018-05-22 PROCEDURE — 84439 ASSAY OF FREE THYROXINE: CPT

## 2018-05-22 PROCEDURE — 82962 GLUCOSE BLOOD TEST: CPT

## 2018-05-22 PROCEDURE — 83605 ASSAY OF LACTIC ACID: CPT

## 2018-05-22 PROCEDURE — 85014 HEMATOCRIT: CPT

## 2018-05-22 PROCEDURE — 84100 ASSAY OF PHOSPHORUS: CPT

## 2018-05-22 PROCEDURE — 84481 FREE ASSAY (FT-3): CPT

## 2018-05-22 PROCEDURE — 84295 ASSAY OF SERUM SODIUM: CPT

## 2018-05-22 RX ORDER — METOPROLOL TARTRATE 50 MG
1 TABLET ORAL
Qty: 0 | Refills: 0 | COMMUNITY

## 2018-05-22 RX ORDER — ASPIRIN/CALCIUM CARB/MAGNESIUM 324 MG
1 TABLET ORAL
Qty: 0 | Refills: 0 | COMMUNITY

## 2018-05-22 RX ORDER — METOPROLOL TARTRATE 50 MG
1 TABLET ORAL
Qty: 30 | Refills: 0 | OUTPATIENT
Start: 2018-05-22 | End: 2018-06-20

## 2018-05-22 RX ORDER — APIXABAN 2.5 MG/1
1 TABLET, FILM COATED ORAL
Qty: 60 | Refills: 0 | OUTPATIENT
Start: 2018-05-22 | End: 2018-06-20

## 2018-05-22 RX ADMIN — APIXABAN 5 MILLIGRAM(S): 2.5 TABLET, FILM COATED ORAL at 05:20

## 2018-05-22 RX ADMIN — Medication 25 MILLIGRAM(S): at 05:20

## 2018-05-22 RX ADMIN — Medication 1 APPLICATION(S): at 05:20

## 2018-05-22 NOTE — DISCHARGE NOTE ADULT - PLAN OF CARE
Symptoms improved Atrial fibrillation is the most common heart rhythm problem.  The condition puts you at risk for has stroke and heart attack  It helps if you control your blood pressure, not drink more than 1-2 alcohol drinks per day, cut down on caffeine, getting treatment for over active thyroid gland, and get regular exercise  Call your doctor if you feel your heart racing or beating unusually, chest tightness or pain, lightheaded, faint, shortness of breath especially with exercise  It is important to take your heart medication as prescribed  You may be on anticoagulation which is very important to take as directed - you may need blood work to monitor drug levels Coronary artery disease is a condition where the arteries the supply the heart muscle get clogges with fatty deposits & puts you at risk for a heart attack  Call your doctor if you have any new pain, pressure, or discomfort in the center of your chest, pain, tingling or discomfort in arms, back, neck, jaw, or stomach, shortness of breath, nausea, vomiting, burping or heartburn, sweating, cold and clammy skin, racing or abnormal heartbeat for more than 10 minutes or if they keep coming & going.  Call 911 and do not tr to get to hospital by care  You can help yourself with lefestyle changes (quitting smoking if you smoke), eat lots of fruits & vegetables & low fat dairy products, not a lot of meat & fatty foods, walk or some form of physical activity most days of the week, lose weight if you are overweight  Take your cardiac medication as prescribed to lower cholesterol, to lower blood pressure, aspirin to prevent blood clots, and diabetes control  Make sure to keep appointments with doctor for cardiac follow up care

## 2018-05-22 NOTE — DISCHARGE NOTE ADULT - CARE PROVIDER_API CALL
Stanford Woods), Cardiology; Internal Medicine  [address] [address]  Phone: [phone]  Fax: [phone]

## 2018-05-22 NOTE — DISCHARGE NOTE ADULT - SECONDARY DIAGNOSIS.
Pure hyperglyceridemia Coronary artery disease, angina presence unspecified, unspecified vessel or lesion type, unspecified whether native or transplanted heart

## 2018-05-22 NOTE — DISCHARGE NOTE ADULT - MEDICATION SUMMARY - MEDICATIONS TO TAKE
I will START or STAY ON the medications listed below when I get home from the hospital:    Flomax 0.4 mg oral capsule  -- 1 cap(s) by mouth once a day  -- Indication: For BPH (Benign Prostatic Hyperplasia)    apixaban 2.5 mg oral tablet  -- 1 tab(s) by mouth 2 times a day   -- Check with your doctor before becoming pregnant.  It is very important that you take or use this exactly as directed.  Do not skip doses or discontinue unless directed by your doctor.  Obtain medical advice before taking any non-prescription drugs as some may affect the action of this medication.    -- Indication: For Atrial fibrillation    atorvastatin 10 mg oral tablet  -- 1 tab(s) by mouth once a day  -- Indication: For Hyperlipidemia    metoprolol succinate 100 mg oral tablet, extended release  -- 1 tab(s) by mouth once a day   -- It is very important that you take or use this exactly as directed.  Do not skip doses or discontinue unless directed by your doctor.  May cause drowsiness.  Alcohol may intensify this effect.  Use care when operating dangerous machinery.  Some non-prescription drugs may aggravate your condition.  Read all labels carefully.  If a warning appears, check with your doctor before taking.  Swallow whole.  Do not crush.  Take with food or milk.  This drug may impair the ability to drive or operate machinery.  Use care until you become familiar with its effects.    -- Indication: For HTN    Multiple Vitamins oral tablet  -- 1 tab(s) by mouth once a day  -- Indication: For SUPPLEMENT    Vitamin B Complex 100  -- 1 tab(s) by mouth once a day  -- Indication: For SUPPLEMENT    Vitamin C 500 mg oral tablet, chewable  -- 1 tab(s) by mouth once a day  -- Indication: For SUPPLEMENT    Vitamin D3 1000 intl units oral tablet  -- 1 tab(s) by mouth once a day  -- Indication: For SUPPLEMENT I will START or STAY ON the medications listed below when I get home from the hospital:    Flomax 0.4 mg oral capsule  -- 1 cap(s) by mouth once a day  -- Indication: For BPH (Benign Prostatic Hyperplasia)    apixaban 5 mg oral tablet  -- 1 tab(s) by mouth 2 times a day   -- Check with your doctor before becoming pregnant.  It is very important that you take or use this exactly as directed.  Do not skip doses or discontinue unless directed by your doctor.  Obtain medical advice before taking any non-prescription drugs as some may affect the action of this medication.    -- Indication: For Atrial fibrillation with RVR    atorvastatin 10 mg oral tablet  -- 1 tab(s) by mouth once a day  -- Indication: For Hyperlipidemia    metoprolol succinate 100 mg oral tablet, extended release  -- 1 tab(s) by mouth once a day   -- It is very important that you take or use this exactly as directed.  Do not skip doses or discontinue unless directed by your doctor.  May cause drowsiness.  Alcohol may intensify this effect.  Use care when operating dangerous machinery.  Some non-prescription drugs may aggravate your condition.  Read all labels carefully.  If a warning appears, check with your doctor before taking.  Swallow whole.  Do not crush.  Take with food or milk.  This drug may impair the ability to drive or operate machinery.  Use care until you become familiar with its effects.    -- Indication: For HTN    Multiple Vitamins oral tablet  -- 1 tab(s) by mouth once a day  -- Indication: For SUPPLEMENT    Vitamin B Complex 100  -- 1 tab(s) by mouth once a day  -- Indication: For SUPPLEMENT    Vitamin C 500 mg oral tablet, chewable  -- 1 tab(s) by mouth once a day  -- Indication: For SUPPLEMENT    Vitamin D3 1000 intl units oral tablet  -- 1 tab(s) by mouth once a day  -- Indication: For SUPPLEMENT

## 2018-05-22 NOTE — DISCHARGE NOTE ADULT - MEDICATION SUMMARY - MEDICATIONS TO CHANGE
I will SWITCH the dose or number of times a day I take the medications listed below when I get home from the hospital:    metoprolol tartrate 25 mg oral tablet  -- 1 tab(s) by mouth once a day

## 2018-05-22 NOTE — DISCHARGE NOTE ADULT - HOSPITAL COURSE
89 M w/ Hx of CAD s/p PCI 2002, paroxysmal AF not on a/c,  provoked PE s/p L TKR  (no longer on coumadin), HTN, BPH, who was BIBEMS for generalized weakness found to be in Afib with RVR also with lactic acidosis    ·  Problem: Generalized weakness.  Plan: doubt ACS or acute infectiosu process  -treat Afib with RVR as below.   ·  Problem: Atrial fibrillation with RVR.  Plan: -had Afib with RVR on EKG and on tele, s/p IV metoprolol 5mg IV by ER  -on metoprolol tartrate  25mg bid for now, may need uptitration to 100mg po   -monitor on tele, cont eliquis  -cardio consult with Dr Woods appreciated  tTE pending but there is backlog.    Problem/Plan - 3:  ·  Problem: CAD (coronary artery disease).  Plan: hx of remote stent  c/w aspirin 81mg daily  c/w atorvastatin 10mg qhs  betablocker as above.      Problem/Plan - 4:  ·  Problem: Lactic acid increased.  Plan: lactate 4.4 likely due to transient hypoperfusion during Afib with RVR  s/p 1.5L IVF by ER  repeat lactate now, if still elevated additional IVF can be considered.      Problem/Plan - 5:  ·  Problem: Hypophosphatemia.  Plan: phos 2.1  replete with 2 doses of neutra phos  monitor bmp, mg, phos, replete as needed.      Problem/Plan - 6:  Problem: BPH (Benign Prostatic Hyperplasia). Plan: c/w flomax.    ·  Problem: Hyperlipidemia.  Plan: c/w statin.

## 2018-05-22 NOTE — DISCHARGE NOTE ADULT - PATIENT PORTAL LINK FT
You can access the [facility], offered by [facility], by registering with the following website: [email or web address]

## 2018-05-22 NOTE — PROGRESS NOTE ADULT - SUBJECTIVE AND OBJECTIVE BOX
No complaints, resumed NSR without a pause during the night  PAST MEDICAL & SURGICAL HISTORY:  BPH (Benign Prostatic Hyperplasia)  Urinary retention: to ER for catheriztion   CAD (coronary artery disease): MI   Hyperlipidemia  H/O spinal stenosis  Cataract: bilateral lens implant   Basal cell cancer: excised from face   H/O hernia repair:   History of hip replacement, total: right   S/P arthroscopy: left knee   S/P appendectomy  H/O angioplasty: 1 stent placed     Medications:  apixaban 5 milliGRAM(s) Oral every 12 hours  atorvastatin 10 milliGRAM(s) Oral at bedtime  BACItracin   Ointment 1 Application(s) Topical every 8 hours  cholecalciferol 1000 Unit(s) Oral daily  metoprolol tartrate 25 milliGRAM(s) Oral two times a day  multivitamin 1 Tablet(s) Oral daily  tamsulosin 0.4 milliGRAM(s) Oral daily  vitamin B complex with vitamin C 1 Tablet(s) Oral daily      Vitals:  T(C): 36.5 (18 @ 04:03), Max: 36.5 (18 @ 13:00)  HR: 75 (18 @ 05:17) (54 - 92)  BP: 151/80 (18 @ 04:03) (117/70 - 151/80)  BP(mean): --  RR: 20 (18 @ 04:03) (16 - 20)  SpO2: 97% (18 @ 04:03) (97% - 98%)  Wt(kg): --  Daily     Daily Weight in k (22 May 2018 05:35)  I&O's Summary    21 May 2018 07:01  -  22 May 2018 07:00  --------------------------------------------------------  IN: 960 mL / OUT: 200 mL / NET: 760 mL        Physical Exam:  Appearance: [ x] Normal [x ] NAD  Eyes: [x ] PERRL [ ] EOMI  HENT: [x ] Normal oral [place] [ ]NC/AT  Cardiovascular: [x ] S1 [x ] S2 [ x] RRR [ ] No m/r/g [ ]No edema [ ] JVP  Procedural Access Site: [ ] No hematoma [ ] Non-tender to palpation [ ] 2+ pulse [ ] No bruit [ ] No Ecchymosis  Respiratory: [x ] Clear to auscultation bilaterally  Gastrointestinal: [x ] Soft [ ] Non-tender [ ] Non-distended [ ] BS+  Musculoskeletal: [x ] No clubbing [ ] No joint deformity   Neurologic: [ x] Non-focal  Lymphatic: [ x] No lymphadenopathy  Psychiatry: [x ] AAOx3 [ ] Mood & affect appropriate  Skin: [ ] No rashes [ ] No ecchymoses [ ] No cyanosis        146<H>  |  111<H>  |  27<H>  ----------------------------<  95  4.4   |  25  |  1.31<H>    Ca    8.9      22 May 2018 06:41  Phos  2.9     05-21  Mg     2.2     05-21        CARDIAC MARKERS ( 20 May 2018 12:29 )  x     / <0.01 ng/mL / 107 U/L / x     / 5.7 ng/mL              ECG:    Echo:    Stress Testing:     Cath:    Imaging:    Interpretation of Telemetry:

## 2018-05-22 NOTE — DISCHARGE NOTE ADULT - CARE PLAN
Principal Discharge DX:	Atrial fibrillation with RVR  Goal:	Symptoms improved  Assessment and plan of treatment:	Atrial fibrillation is the most common heart rhythm problem.  The condition puts you at risk for has stroke and heart attack  It helps if you control your blood pressure, not drink more than 1-2 alcohol drinks per day, cut down on caffeine, getting treatment for over active thyroid gland, and get regular exercise  Call your doctor if you feel your heart racing or beating unusually, chest tightness or pain, lightheaded, faint, shortness of breath especially with exercise  It is important to take your heart medication as prescribed  You may be on anticoagulation which is very important to take as directed - you may need blood work to monitor drug levels  Secondary Diagnosis:	Pure hyperglyceridemia  Secondary Diagnosis:	Coronary artery disease, angina presence unspecified, unspecified vessel or lesion type, unspecified whether native or transplanted heart  Assessment and plan of treatment:	Coronary artery disease is a condition where the arteries the supply the heart muscle get clogges with fatty deposits & puts you at risk for a heart attack  Call your doctor if you have any new pain, pressure, or discomfort in the center of your chest, pain, tingling or discomfort in arms, back, neck, jaw, or stomach, shortness of breath, nausea, vomiting, burping or heartburn, sweating, cold and clammy skin, racing or abnormal heartbeat for more than 10 minutes or if they keep coming & going.  Call 911 and do not tr to get to hospital by care  You can help yourself with lefestyle changes (quitting smoking if you smoke), eat lots of fruits & vegetables & low fat dairy products, not a lot of meat & fatty foods, walk or some form of physical activity most days of the week, lose weight if you are overweight  Take your cardiac medication as prescribed to lower cholesterol, to lower blood pressure, aspirin to prevent blood clots, and diabetes control  Make sure to keep appointments with doctor for cardiac follow up care

## 2018-05-22 NOTE — PROGRESS NOTE ADULT - ASSESSMENT
Back in NSR and no symptoms.  Echo lab backed up.  Pt can go home today and would send on Eliquis 25 BID, Metoprolo 100 XL and off ASA.  Will follow in office and do echo.    DARREL Woods  [phone]

## 2018-05-31 ENCOUNTER — APPOINTMENT (OUTPATIENT)
Dept: CARDIOLOGY | Facility: CLINIC | Age: 83
End: 2018-05-31
Payer: MEDICARE

## 2018-05-31 VITALS
HEIGHT: 72 IN | SYSTOLIC BLOOD PRESSURE: 144 MMHG | DIASTOLIC BLOOD PRESSURE: 71 MMHG | BODY MASS INDEX: 24.65 KG/M2 | HEART RATE: 55 BPM | TEMPERATURE: 97.8 F | WEIGHT: 182 LBS | OXYGEN SATURATION: 95 %

## 2018-05-31 VITALS
WEIGHT: 182 LBS | SYSTOLIC BLOOD PRESSURE: 139 MMHG | BODY MASS INDEX: 24.65 KG/M2 | HEIGHT: 72 IN | DIASTOLIC BLOOD PRESSURE: 80 MMHG

## 2018-05-31 PROCEDURE — 93306 TTE W/DOPPLER COMPLETE: CPT

## 2018-05-31 PROCEDURE — 99214 OFFICE O/P EST MOD 30 MIN: CPT

## 2018-06-08 ENCOUNTER — MEDICATION RENEWAL (OUTPATIENT)
Age: 83
End: 2018-06-08

## 2018-06-11 ENCOUNTER — RX RENEWAL (OUTPATIENT)
Age: 83
End: 2018-06-11

## 2018-07-17 ENCOUNTER — MEDICATION RENEWAL (OUTPATIENT)
Age: 83
End: 2018-07-17

## 2018-07-19 ENCOUNTER — MEDICATION RENEWAL (OUTPATIENT)
Age: 83
End: 2018-07-19

## 2018-07-27 ENCOUNTER — APPOINTMENT (OUTPATIENT)
Dept: CARDIOLOGY | Facility: CLINIC | Age: 83
End: 2018-07-27
Payer: MEDICARE

## 2018-07-27 ENCOUNTER — NON-APPOINTMENT (OUTPATIENT)
Age: 83
End: 2018-07-27

## 2018-07-27 VITALS — DIASTOLIC BLOOD PRESSURE: 75 MMHG | SYSTOLIC BLOOD PRESSURE: 130 MMHG

## 2018-07-27 VITALS
HEART RATE: 70 BPM | BODY MASS INDEX: 24.14 KG/M2 | OXYGEN SATURATION: 97 % | WEIGHT: 178 LBS | SYSTOLIC BLOOD PRESSURE: 161 MMHG | DIASTOLIC BLOOD PRESSURE: 73 MMHG

## 2018-07-27 DIAGNOSIS — R07.89 OTHER CHEST PAIN: ICD-10-CM

## 2018-07-27 PROCEDURE — 93000 ELECTROCARDIOGRAM COMPLETE: CPT

## 2018-07-27 PROCEDURE — 99214 OFFICE O/P EST MOD 30 MIN: CPT

## 2018-10-03 ENCOUNTER — APPOINTMENT (OUTPATIENT)
Dept: CARDIOLOGY | Facility: CLINIC | Age: 83
End: 2018-10-03

## 2018-11-07 ENCOUNTER — NON-APPOINTMENT (OUTPATIENT)
Age: 83
End: 2018-11-07

## 2018-11-07 ENCOUNTER — APPOINTMENT (OUTPATIENT)
Dept: CARDIOLOGY | Facility: CLINIC | Age: 83
End: 2018-11-07
Payer: MEDICARE

## 2018-11-07 VITALS
DIASTOLIC BLOOD PRESSURE: 91 MMHG | HEART RATE: 72 BPM | WEIGHT: 180 LBS | BODY MASS INDEX: 24.41 KG/M2 | OXYGEN SATURATION: 99 % | SYSTOLIC BLOOD PRESSURE: 167 MMHG

## 2018-11-07 VITALS — DIASTOLIC BLOOD PRESSURE: 80 MMHG | SYSTOLIC BLOOD PRESSURE: 130 MMHG

## 2018-11-07 PROCEDURE — 99214 OFFICE O/P EST MOD 30 MIN: CPT

## 2018-11-07 PROCEDURE — 93000 ELECTROCARDIOGRAM COMPLETE: CPT

## 2018-11-07 NOTE — PHYSICAL EXAM
[General Appearance - Well Developed] : well developed [Normal Appearance] : normal appearance [Well Groomed] : well groomed [General Appearance - Well Nourished] : well nourished [No Deformities] : no deformities [General Appearance - In No Acute Distress] : no acute distress [Normal Conjunctiva] : the conjunctiva exhibited no abnormalities [Eyelids - No Xanthelasma] : the eyelids demonstrated no xanthelasmas [Normal Oral Mucosa] : normal oral mucosa [No Oral Pallor] : no oral pallor [No Oral Cyanosis] : no oral cyanosis [Normal Jugular Venous A Waves Present] : normal jugular venous A waves present [Normal Jugular Venous V Waves Present] : normal jugular venous V waves present [No Jugular Venous Plummer A Waves] : no jugular venous plummer A waves [Respiration, Rhythm And Depth] : normal respiratory rhythm and effort [Exaggerated Use Of Accessory Muscles For Inspiration] : no accessory muscle use [Auscultation Breath Sounds / Voice Sounds] : lungs were clear to auscultation bilaterally [Heart Rate And Rhythm] : heart rate and rhythm were normal [Heart Sounds] : normal S1 and S2 [Murmurs] : no murmurs present [Abdomen Soft] : soft [Abdomen Tenderness] : non-tender [Abdomen Mass (___ Cm)] : no abdominal mass palpated [Abnormal Walk] : normal gait [Gait - Sufficient For Exercise Testing] : the gait was sufficient for exercise testing [Nail Clubbing] : no clubbing of the fingernails [Cyanosis, Localized] : no localized cyanosis [Petechial Hemorrhages (___cm)] : no petechial hemorrhages [Skin Color & Pigmentation] : normal skin color and pigmentation [] : no rash [No Venous Stasis] : no venous stasis [Skin Lesions] : no skin lesions [No Skin Ulcers] : no skin ulcer [No Xanthoma] : no  xanthoma was observed [Oriented To Time, Place, And Person] : oriented to person, place, and time [Affect] : the affect was normal [Mood] : the mood was normal [No Anxiety] : not feeling anxious

## 2018-11-07 NOTE — DISCUSSION/SUMMARY
[FreeTextEntry1] : Mr. Guido has no cardiac complaints and looks unchanged. His exam shows irregular rhythm with premature contractions, normal repeat blood pressure, no evidence of CHF. His EKG shows sinus rhythm with some APCs and is unchanged. I made no changes in his regimen. He will be going to [place] and followup in the spring

## 2019-01-03 ENCOUNTER — RX RENEWAL (OUTPATIENT)
Age: 84
End: 2019-01-03

## 2019-01-18 NOTE — H&P ADULT - NSHPLABSRESULTS_GEN_ALL_CORE
personally reviewed labs:  hgb and wbc wnl  platelet 87 (unknown baseline)  CMP wnl ,creatinine 1.26  phos 2.1  trop negative x1  Lactate 4.4    personally reviewed EKG: Afib with RVR    personally reviewed CXR: clear lungs (3) adequate personally reviewed labs:  hgb and wbc wnl  platelet 87 (unknown baseline)  CMP wnl ,creatinine 1.26  phos 2.1  trop negative x1  Lactate 4.4    personally reviewed EKG: Afib with RVR @ 106, Q waves in V1, v2, and lead III    personally reviewed CXR: clear lungs

## 2019-04-08 ENCOUNTER — RX RENEWAL (OUTPATIENT)
Age: 84
End: 2019-04-08

## 2019-04-22 ENCOUNTER — MEDICATION RENEWAL (OUTPATIENT)
Age: 84
End: 2019-04-22

## 2019-04-23 RX ORDER — APIXABAN 5 MG/1
5 TABLET, FILM COATED ORAL
Qty: 180 | Refills: 3 | Status: ACTIVE | COMMUNITY
Start: 2018-05-31 | End: 1900-01-01

## 2019-06-11 ENCOUNTER — RX RENEWAL (OUTPATIENT)
Age: 84
End: 2019-06-11

## 2019-06-11 RX ORDER — MULTIVITAMIN
TABLET ORAL
Qty: 28 | Refills: 9 | Status: ACTIVE | COMMUNITY
Start: 2019-06-11 | End: 1900-01-01

## 2019-07-08 ENCOUNTER — APPOINTMENT (OUTPATIENT)
Dept: CARDIOLOGY | Facility: CLINIC | Age: 84
End: 2019-07-08
Payer: MEDICARE

## 2019-07-08 ENCOUNTER — NON-APPOINTMENT (OUTPATIENT)
Age: 84
End: 2019-07-08

## 2019-07-08 VITALS — DIASTOLIC BLOOD PRESSURE: 80 MMHG | SYSTOLIC BLOOD PRESSURE: 140 MMHG

## 2019-07-08 VITALS
WEIGHT: 172 LBS | OXYGEN SATURATION: 100 % | BODY MASS INDEX: 23.33 KG/M2 | HEART RATE: 73 BPM | DIASTOLIC BLOOD PRESSURE: 82 MMHG | SYSTOLIC BLOOD PRESSURE: 155 MMHG

## 2019-07-08 DIAGNOSIS — M48.061 SPINAL STENOSIS, LUMBAR REGION WITHOUT NEUROGENIC CLAUDICATION: ICD-10-CM

## 2019-07-08 DIAGNOSIS — Z95.0 PRESENCE OF CARDIAC PACEMAKER: ICD-10-CM

## 2019-07-08 PROCEDURE — 93000 ELECTROCARDIOGRAM COMPLETE: CPT

## 2019-07-08 PROCEDURE — 99214 OFFICE O/P EST MOD 30 MIN: CPT

## 2019-07-08 NOTE — DISCUSSION/SUMMARY
[FreeTextEntry1] : Mr. Guido has no current cardiac complaints and looks unchanged. His blood pressure is borderline and he has a regular rhythm with no evidence of CHF. His EKG shows a paced rhythm. I made no change in his regimen. He will obtain a series of home BP readings and followup here in 4 months.

## 2019-07-08 NOTE — PHYSICAL EXAM
[General Appearance - Well Developed] : well developed [Normal Appearance] : normal appearance [Well Groomed] : well groomed [General Appearance - Well Nourished] : well nourished [No Deformities] : no deformities [General Appearance - In No Acute Distress] : no acute distress [Eyelids - No Xanthelasma] : the eyelids demonstrated no xanthelasmas [Normal Conjunctiva] : the conjunctiva exhibited no abnormalities [Normal Oral Mucosa] : normal oral mucosa [No Oral Pallor] : no oral pallor [Normal Jugular Venous A Waves Present] : normal jugular venous A waves present [No Oral Cyanosis] : no oral cyanosis [No Jugular Venous Plummer A Waves] : no jugular venous plummer A waves [Normal Jugular Venous V Waves Present] : normal jugular venous V waves present [Respiration, Rhythm And Depth] : normal respiratory rhythm and effort [Exaggerated Use Of Accessory Muscles For Inspiration] : no accessory muscle use [Auscultation Breath Sounds / Voice Sounds] : lungs were clear to auscultation bilaterally [Heart Rate And Rhythm] : heart rate and rhythm were normal [Heart Sounds] : normal S1 and S2 [Murmurs] : no murmurs present [Abdomen Soft] : soft [Abdomen Tenderness] : non-tender [Abdomen Mass (___ Cm)] : no abdominal mass palpated [Abnormal Walk] : normal gait [Gait - Sufficient For Exercise Testing] : the gait was sufficient for exercise testing [Cyanosis, Localized] : no localized cyanosis [Nail Clubbing] : no clubbing of the fingernails [Petechial Hemorrhages (___cm)] : no petechial hemorrhages [Skin Color & Pigmentation] : normal skin color and pigmentation [] : no rash [No Venous Stasis] : no venous stasis [No Skin Ulcers] : no skin ulcer [Skin Lesions] : no skin lesions [No Xanthoma] : no  xanthoma was observed [Oriented To Time, Place, And Person] : oriented to person, place, and time [Affect] : the affect was normal [Mood] : the mood was normal [No Anxiety] : not feeling anxious

## 2019-07-08 NOTE — HISTORY OF PRESENT ILLNESS
[FreeTextEntry1] : 89-year-old male with a history of CAD, paroxysmal atrial fibrillation,  hypertension, and hypercholesterolemia.  He had a pacemaker implanted in [place] over the winter and his metoprolol was decreased.

## 2019-07-10 ENCOUNTER — MEDICATION RENEWAL (OUTPATIENT)
Age: 84
End: 2019-07-10

## 2019-07-10 RX ORDER — AMLODIPINE BESYLATE 5 MG/1
5 TABLET ORAL DAILY
Qty: 30 | Refills: 9 | Status: ACTIVE | COMMUNITY
Start: 2019-07-10 | End: 1900-01-01

## 2019-09-11 ENCOUNTER — APPOINTMENT (OUTPATIENT)
Dept: UROLOGY | Facility: CLINIC | Age: 84
End: 2019-09-11
Payer: MEDICARE

## 2019-09-11 PROCEDURE — 99204 OFFICE O/P NEW MOD 45 MIN: CPT

## 2019-09-11 NOTE — PHYSICAL EXAM
[General Appearance - Well Developed] : well developed [Normal Appearance] : normal appearance [General Appearance - Well Nourished] : well nourished [Well Groomed] : well groomed [General Appearance - In No Acute Distress] : no acute distress [Edema] : no peripheral edema [Respiration, Rhythm And Depth] : normal respiratory rhythm and effort [Exaggerated Use Of Accessory Muscles For Inspiration] : no accessory muscle use [Abdomen Soft] : soft [Costovertebral Angle Tenderness] : no ~M costovertebral angle tenderness [Abdomen Tenderness] : non-tender [Urethral Meatus] : meatus normal [Urinary Bladder Findings] : the bladder was normal on palpation [Scrotum] : the scrotum was normal [No Prostate Nodules] : no prostate nodules [Testes Mass (___cm)] : there were no testicular masses [] : no rash [Normal Station and Gait] : the gait and station were normal for the patient's age [No Focal Deficits] : no focal deficits [Oriented To Time, Place, And Person] : oriented to person, place, and time [Mood] : the mood was normal [Affect] : the affect was normal [No Palpable Adenopathy] : no palpable adenopathy [Not Anxious] : not anxious

## 2019-09-12 LAB
APPEARANCE: CLEAR
BACTERIA: NEGATIVE
BILIRUBIN URINE: NEGATIVE
BLOOD URINE: NEGATIVE
COLOR: YELLOW
GLUCOSE QUALITATIVE U: NEGATIVE
HYALINE CASTS: 1 /LPF
KETONES URINE: NEGATIVE
LEUKOCYTE ESTERASE URINE: NEGATIVE
MICROSCOPIC-UA: NORMAL
NITRITE URINE: NEGATIVE
PH URINE: 6
PROTEIN URINE: NEGATIVE
RED BLOOD CELLS URINE: 1 /HPF
SPECIFIC GRAVITY URINE: 1.03
SQUAMOUS EPITHELIAL CELLS: 1 /HPF
UROBILINOGEN URINE: NORMAL
WHITE BLOOD CELLS URINE: 1 /HPF

## 2019-10-08 ENCOUNTER — RX RENEWAL (OUTPATIENT)
Age: 84
End: 2019-10-08

## 2019-10-08 RX ORDER — METOPROLOL SUCCINATE 25 MG/1
25 TABLET, EXTENDED RELEASE ORAL
Qty: 56 | Refills: 5 | Status: ACTIVE | COMMUNITY
Start: 2018-05-31 | End: 1900-01-01

## 2019-10-28 ENCOUNTER — NON-APPOINTMENT (OUTPATIENT)
Age: 84
End: 2019-10-28

## 2019-10-28 ENCOUNTER — APPOINTMENT (OUTPATIENT)
Dept: CARDIOLOGY | Facility: CLINIC | Age: 84
End: 2019-10-28
Payer: MEDICARE

## 2019-10-28 VITALS
BODY MASS INDEX: 24.65 KG/M2 | HEIGHT: 72 IN | WEIGHT: 182 LBS | SYSTOLIC BLOOD PRESSURE: 148 MMHG | DIASTOLIC BLOOD PRESSURE: 75 MMHG | HEART RATE: 72 BPM | OXYGEN SATURATION: 99 %

## 2019-10-28 VITALS — DIASTOLIC BLOOD PRESSURE: 80 MMHG | SYSTOLIC BLOOD PRESSURE: 130 MMHG

## 2019-10-28 DIAGNOSIS — I10 ESSENTIAL (PRIMARY) HYPERTENSION: ICD-10-CM

## 2019-10-28 DIAGNOSIS — I48.0 PAROXYSMAL ATRIAL FIBRILLATION: ICD-10-CM

## 2019-10-28 DIAGNOSIS — I25.10 ATHEROSCLEROTIC HEART DISEASE OF NATIVE CORONARY ARTERY W/OUT ANGINA PECTORIS: ICD-10-CM

## 2019-10-28 PROCEDURE — 99214 OFFICE O/P EST MOD 30 MIN: CPT

## 2019-10-28 PROCEDURE — 93000 ELECTROCARDIOGRAM COMPLETE: CPT

## 2019-10-28 NOTE — HISTORY OF PRESENT ILLNESS
[FreeTextEntry1] : 89-year-old male with a history of CAD, paroxysmal atrial fibrillation, pacemaker, hypertension, and hypercholesterolemia.  He has no cardiac complaints. He remains active, although he is limited by his back. He will be leaving for [place] in December.

## 2019-10-28 NOTE — DISCUSSION/SUMMARY
[FreeTextEntry1] : Mr. Guido has no cardiac complaints in is doing generally well. His exam shows regular rhythm, normal repeat blood pressure, clear lungs, and a normal cardiac exam. His EKG shows sinus rhythm with a Q. in V2 and is unchanged. He is stable and I made no change in his regimen. He'll followup in the spring.

## 2019-11-07 ENCOUNTER — MEDICATION RENEWAL (OUTPATIENT)
Age: 84
End: 2019-11-07

## 2019-12-05 NOTE — PHYSICAL THERAPY INITIAL EVALUATION ADULT - PHYSICAL ASSIST/NONPHYSICAL ASSIST: STAND/SIT, REHAB EVAL
verbal cues/supervision Ear Wedge Repair Text: A wedge excision was completed by carrying down an excision through the full thickness of the ear and cartilage with an inward facing Burow's triangle. The wound was then closed in a layered fashion.

## 2020-03-04 ENCOUNTER — RX RENEWAL (OUTPATIENT)
Age: 85
End: 2020-03-04

## 2020-05-18 ENCOUNTER — RX RENEWAL (OUTPATIENT)
Age: 85
End: 2020-05-18

## 2021-06-09 ENCOUNTER — NON-APPOINTMENT (OUTPATIENT)
Age: 86
End: 2021-06-09

## 2021-06-09 ENCOUNTER — APPOINTMENT (OUTPATIENT)
Dept: CARDIOLOGY | Facility: CLINIC | Age: 86
End: 2021-06-09
Payer: MEDICARE

## 2021-06-09 VITALS
TEMPERATURE: 97.3 F | SYSTOLIC BLOOD PRESSURE: 120 MMHG | HEART RATE: 66 BPM | OXYGEN SATURATION: 100 % | WEIGHT: 181 LBS | BODY MASS INDEX: 24.55 KG/M2 | DIASTOLIC BLOOD PRESSURE: 60 MMHG

## 2021-06-09 PROCEDURE — 99214 OFFICE O/P EST MOD 30 MIN: CPT

## 2021-06-09 PROCEDURE — 93000 ELECTROCARDIOGRAM COMPLETE: CPT

## 2021-06-09 NOTE — HISTORY OF PRESENT ILLNESS
[FreeTextEntry1] : 89-year-old male with a history of CAD, paroxysmal atrial fibrillation with tachybradycardia and a pacemaker, hypertension, hypercholesterolemia.  He has not been seen since before the pandemic in 10/19 and he has been in [place] for almost all this time.  He has been generally well.  He has not had palpitations and has been walking a quarter of a mile at a golf course almost every day.  He has not been walking since he came back to [place] and felt very weak when he tried yesterday.  He has had some peripheral edema which he was told is due to venous disease and is using support stockings.  He had an echo done while there which is unremarkable with normal systolic function.  His pacing rate was decreased slightly.  He is planning to move to [place] permanently after selling his house here.

## 2021-06-09 NOTE — DISCUSSION/SUMMARY
[FreeTextEntry1] : Mr. Guido has not had any major complications during the past 18 months.  He has some peripheral edema which is apparently related to venous insufficiency.  He is not having palpitations and interrogation of his pacemaker does not show any recent bouts of atrial fibrillation.  His EKG shows sinus rhythm with a paced beat.  His cardiac exam does not show any evidence of CHF.\par \par He is stable and no change was made in his regimen.  He will follow-up here if he is still in [place] in 6 months.

## 2021-06-09 NOTE — PHYSICAL EXAM
[Normal] : soft, non-tender, no masses/organomegaly, normal bowel sounds [Edema ___] : edema [unfilled] [de-identified] : Uses a cane

## 2021-07-30 ENCOUNTER — EMERGENCY (EMERGENCY)
Facility: HOSPITAL | Age: 86
LOS: 1 days | Discharge: HOME | End: 2021-07-30
Attending: EMERGENCY MEDICINE
Payer: MEDICARE

## 2021-07-30 VITALS
OXYGEN SATURATION: 99 % | WEIGHT: 175.93 LBS | RESPIRATION RATE: 20 BRPM | SYSTOLIC BLOOD PRESSURE: 164 MMHG | DIASTOLIC BLOOD PRESSURE: 77 MMHG | HEART RATE: 91 BPM | TEMPERATURE: 100 F | HEIGHT: 72 IN

## 2021-07-30 LAB
ALBUMIN SERPL ELPH-MCNC: 3.8 G/DL — SIGNIFICANT CHANGE UP (ref 3.3–5)
ALP SERPL-CCNC: 93 U/L — SIGNIFICANT CHANGE UP (ref 40–120)
ALT FLD-CCNC: 18 U/L — SIGNIFICANT CHANGE UP (ref 10–45)
ANION GAP SERPL CALC-SCNC: 12 MMOL/L — SIGNIFICANT CHANGE UP (ref 5–17)
APPEARANCE UR: ABNORMAL
APTT BLD: 31.9 SEC — SIGNIFICANT CHANGE UP (ref 27.5–35.5)
AST SERPL-CCNC: 18 U/L — SIGNIFICANT CHANGE UP (ref 10–40)
BACTERIA # UR AUTO: ABNORMAL
BASE EXCESS BLDV CALC-SCNC: 2.8 MMOL/L — HIGH (ref -2–2)
BASOPHILS # BLD AUTO: 0 K/UL — SIGNIFICANT CHANGE UP (ref 0–0.2)
BASOPHILS NFR BLD AUTO: 0 % — SIGNIFICANT CHANGE UP (ref 0–2)
BILIRUB SERPL-MCNC: 0.8 MG/DL — SIGNIFICANT CHANGE UP (ref 0.2–1.2)
BILIRUB UR-MCNC: NEGATIVE — SIGNIFICANT CHANGE UP
BUN SERPL-MCNC: 27 MG/DL — HIGH (ref 7–23)
CA-I SERPL-SCNC: 1.15 MMOL/L — SIGNIFICANT CHANGE UP (ref 1.12–1.3)
CALCIUM SERPL-MCNC: 9.4 MG/DL — SIGNIFICANT CHANGE UP (ref 8.4–10.5)
CHLORIDE BLDV-SCNC: 112 MMOL/L — HIGH (ref 96–108)
CHLORIDE SERPL-SCNC: 104 MMOL/L — SIGNIFICANT CHANGE UP (ref 96–108)
CO2 BLDV-SCNC: 29 MMOL/L — SIGNIFICANT CHANGE UP (ref 22–30)
CO2 SERPL-SCNC: 23 MMOL/L — SIGNIFICANT CHANGE UP (ref 22–31)
COLOR SPEC: YELLOW — SIGNIFICANT CHANGE UP
CREAT SERPL-MCNC: 1.1 MG/DL — SIGNIFICANT CHANGE UP (ref 0.5–1.3)
DIFF PNL FLD: ABNORMAL
EOSINOPHIL # BLD AUTO: 0 K/UL — SIGNIFICANT CHANGE UP (ref 0–0.5)
EOSINOPHIL NFR BLD AUTO: 0 % — SIGNIFICANT CHANGE UP (ref 0–6)
EPI CELLS # UR: 0 /HPF — SIGNIFICANT CHANGE UP
GAS PNL BLDV: 134 MMOL/L — LOW (ref 135–145)
GAS PNL BLDV: SIGNIFICANT CHANGE UP
GLUCOSE BLDV-MCNC: 189 MG/DL — HIGH (ref 70–99)
GLUCOSE SERPL-MCNC: 187 MG/DL — HIGH (ref 70–99)
GLUCOSE UR QL: ABNORMAL
HCO3 BLDV-SCNC: 28 MMOL/L — SIGNIFICANT CHANGE UP (ref 21–29)
HCT VFR BLD CALC: 45.1 % — SIGNIFICANT CHANGE UP (ref 39–50)
HCT VFR BLDA CALC: 48 % — SIGNIFICANT CHANGE UP (ref 39–50)
HGB BLD CALC-MCNC: 15.6 G/DL — SIGNIFICANT CHANGE UP (ref 13–17)
HGB BLD-MCNC: 14.7 G/DL — SIGNIFICANT CHANGE UP (ref 13–17)
HYALINE CASTS # UR AUTO: 0 /LPF — SIGNIFICANT CHANGE UP (ref 0–2)
INR BLD: 1.59 RATIO — HIGH (ref 0.88–1.16)
KETONES UR-MCNC: NEGATIVE — SIGNIFICANT CHANGE UP
LACTATE BLDV-MCNC: 1.6 MMOL/L — SIGNIFICANT CHANGE UP (ref 0.7–2)
LEUKOCYTE ESTERASE UR-ACNC: ABNORMAL
LYMPHOCYTES # BLD AUTO: 0.48 K/UL — LOW (ref 1–3.3)
LYMPHOCYTES # BLD AUTO: 3.5 % — LOW (ref 13–44)
MANUAL SMEAR VERIFICATION: SIGNIFICANT CHANGE UP
MCHC RBC-ENTMCNC: 30.4 PG — SIGNIFICANT CHANGE UP (ref 27–34)
MCHC RBC-ENTMCNC: 32.6 GM/DL — SIGNIFICANT CHANGE UP (ref 32–36)
MCV RBC AUTO: 93.4 FL — SIGNIFICANT CHANGE UP (ref 80–100)
MONOCYTES # BLD AUTO: 0.61 K/UL — SIGNIFICANT CHANGE UP (ref 0–0.9)
MONOCYTES NFR BLD AUTO: 4.4 % — SIGNIFICANT CHANGE UP (ref 2–14)
NEUTROPHILS # BLD AUTO: 12.76 K/UL — HIGH (ref 1.8–7.4)
NEUTROPHILS NFR BLD AUTO: 92.1 % — HIGH (ref 43–77)
NITRITE UR-MCNC: POSITIVE
PCO2 BLDV: 45 MMHG — SIGNIFICANT CHANGE UP (ref 35–50)
PH BLDV: 7.41 — SIGNIFICANT CHANGE UP (ref 7.35–7.45)
PH UR: 6 — SIGNIFICANT CHANGE UP (ref 5–8)
PLAT MORPH BLD: NORMAL — SIGNIFICANT CHANGE UP
PLATELET # BLD AUTO: 95 K/UL — LOW (ref 150–400)
PO2 BLDV: 30 MMHG — SIGNIFICANT CHANGE UP (ref 25–45)
POTASSIUM BLDV-SCNC: 3.7 MMOL/L — SIGNIFICANT CHANGE UP (ref 3.5–5.3)
POTASSIUM SERPL-MCNC: 4 MMOL/L — SIGNIFICANT CHANGE UP (ref 3.5–5.3)
POTASSIUM SERPL-SCNC: 4 MMOL/L — SIGNIFICANT CHANGE UP (ref 3.5–5.3)
PROT SERPL-MCNC: 6.5 G/DL — SIGNIFICANT CHANGE UP (ref 6–8.3)
PROT UR-MCNC: ABNORMAL
PROTHROM AB SERPL-ACNC: 18.7 SEC — HIGH (ref 10.6–13.6)
RAPID RVP RESULT: SIGNIFICANT CHANGE UP
RBC # BLD: 4.83 M/UL — SIGNIFICANT CHANGE UP (ref 4.2–5.8)
RBC # FLD: 13.5 % — SIGNIFICANT CHANGE UP (ref 10.3–14.5)
RBC BLD AUTO: NORMAL — SIGNIFICANT CHANGE UP
RBC CASTS # UR COMP ASSIST: 5 /HPF — HIGH (ref 0–4)
SAO2 % BLDV: 57 % — LOW (ref 67–88)
SARS-COV-2 RNA SPEC QL NAA+PROBE: SIGNIFICANT CHANGE UP
SODIUM SERPL-SCNC: 139 MMOL/L — SIGNIFICANT CHANGE UP (ref 135–145)
SP GR SPEC: 1.03 — HIGH (ref 1.01–1.02)
UROBILINOGEN FLD QL: NEGATIVE — SIGNIFICANT CHANGE UP
WBC # BLD: 13.85 K/UL — HIGH (ref 3.8–10.5)
WBC # FLD AUTO: 13.85 K/UL — HIGH (ref 3.8–10.5)
WBC UR QL: 289 /HPF — HIGH (ref 0–5)

## 2021-07-30 PROCEDURE — 99285 EMERGENCY DEPT VISIT HI MDM: CPT | Mod: 25

## 2021-07-30 PROCEDURE — 71045 X-RAY EXAM CHEST 1 VIEW: CPT

## 2021-07-30 PROCEDURE — 84132 ASSAY OF SERUM POTASSIUM: CPT

## 2021-07-30 PROCEDURE — 85018 HEMOGLOBIN: CPT

## 2021-07-30 PROCEDURE — 84295 ASSAY OF SERUM SODIUM: CPT

## 2021-07-30 PROCEDURE — 99284 EMERGENCY DEPT VISIT MOD MDM: CPT

## 2021-07-30 PROCEDURE — 80053 COMPREHEN METABOLIC PANEL: CPT

## 2021-07-30 PROCEDURE — 82435 ASSAY OF BLOOD CHLORIDE: CPT

## 2021-07-30 PROCEDURE — 87186 SC STD MICRODIL/AGAR DIL: CPT

## 2021-07-30 PROCEDURE — 82947 ASSAY GLUCOSE BLOOD QUANT: CPT

## 2021-07-30 PROCEDURE — 82330 ASSAY OF CALCIUM: CPT

## 2021-07-30 PROCEDURE — 83605 ASSAY OF LACTIC ACID: CPT

## 2021-07-30 PROCEDURE — 71045 X-RAY EXAM CHEST 1 VIEW: CPT | Mod: 26

## 2021-07-30 PROCEDURE — 82803 BLOOD GASES ANY COMBINATION: CPT

## 2021-07-30 PROCEDURE — 85730 THROMBOPLASTIN TIME PARTIAL: CPT

## 2021-07-30 PROCEDURE — 87040 BLOOD CULTURE FOR BACTERIA: CPT

## 2021-07-30 PROCEDURE — 0225U NFCT DS DNA&RNA 21 SARSCOV2: CPT

## 2021-07-30 PROCEDURE — 85025 COMPLETE CBC W/AUTO DIFF WBC: CPT

## 2021-07-30 PROCEDURE — 85014 HEMATOCRIT: CPT

## 2021-07-30 PROCEDURE — 81001 URINALYSIS AUTO W/SCOPE: CPT

## 2021-07-30 PROCEDURE — 85610 PROTHROMBIN TIME: CPT

## 2021-07-30 PROCEDURE — 93005 ELECTROCARDIOGRAM TRACING: CPT

## 2021-07-30 PROCEDURE — 96374 THER/PROPH/DIAG INJ IV PUSH: CPT

## 2021-07-30 PROCEDURE — 87086 URINE CULTURE/COLONY COUNT: CPT

## 2021-07-30 RX ORDER — ACETAMINOPHEN 500 MG
650 TABLET ORAL ONCE
Refills: 0 | Status: COMPLETED | OUTPATIENT
Start: 2021-07-30 | End: 2021-07-30

## 2021-07-30 RX ORDER — CEFTRIAXONE 500 MG/1
1000 INJECTION, POWDER, FOR SOLUTION INTRAMUSCULAR; INTRAVENOUS ONCE
Refills: 0 | Status: COMPLETED | OUTPATIENT
Start: 2021-07-30 | End: 2021-07-30

## 2021-07-30 RX ORDER — CEFPODOXIME PROXETIL 100 MG
1 TABLET ORAL
Qty: 14 | Refills: 0
Start: 2021-07-30 | End: 2021-08-05

## 2021-07-30 RX ADMIN — Medication 650 MILLIGRAM(S): at 21:18

## 2021-07-30 RX ADMIN — CEFTRIAXONE 100 MILLIGRAM(S): 500 INJECTION, POWDER, FOR SOLUTION INTRAMUSCULAR; INTRAVENOUS at 23:57

## 2021-07-30 NOTE — ED PROVIDER NOTE - NSFOLLOWUPINSTRUCTIONS_ED_ALL_ED_FT
Thank you for choosing [facility] for your healthcare.    You have a urinary tract infection.  We started you on an IV dose of antibiotics in the Emergency Department, you should start the prescribed oral antibiotics tomorrow morning.  Please complete the antibiotic prescription as written.  There were blood cultures sent tonight in the Emergency Department - if they grow bacteria you will be called to come back to the Emergency Department for further testing and possible admission.    Please return to the Emergency Department for the followin.  If we call you and ask you to return  2.  If you feel like you are getting worse, not better at home despite antibiotics  3.  If you are feeling too weak to care for your self at home  4.  If you are vomiting and cannot keep anything down  5.  Any other concerning symptoms

## 2021-07-30 NOTE — ED PROVIDER NOTE - OBJECTIVE STATEMENT
89 M w/ Hx of CAD s/p PCI 2002, paroxysmal AF (eliquis) and PE 2/2 L TKA 2,, HTN, BPH presents to ED c/o weakness and fatigue that started this afternoon. Patient states took his temp which resulted a 102F. States yesterday was in a senior citizen gathering and thinks he might have been exposed to someone that was sick. Of note patient also endorses that he has frequent urination and utis.  Denies nausea, vomiting, shortness of breath, chest pain, abdominal pain, leg swelling.    Fully vaccinated Pfeizer

## 2021-07-30 NOTE — ED PROVIDER NOTE - PROGRESS NOTE DETAILS
Patient reporting feeling improved.  Workup consistent with UTI.  Blood cultures sent but no obvious severe sepsis at this time and patient remains very well appearing.  Will start antibiotics in Emergency Department and discharge with same.  Patient and son comfortable going home and returning if blood cultures positive or if worsening clinically.  Fabiano Mensah M.D.

## 2021-07-30 NOTE — ED PROVIDER NOTE - PATIENT PORTAL LINK FT
You can access the FollowMyHealth Patient Portal offered by [facility] by registering at the following website: [email or web address]. By joining [facility]’s FollowMyHealth portal, you will also be able to view your health information using other applications (apps) compatible with our system.

## 2021-07-30 NOTE — ED PROVIDER NOTE - ATTENDING CONTRIBUTION TO CARE
Patient presenting complaining of sudden onset malaise and weakness earlier this afternoon without associated symptoms.  Denying cough, sore throat, rash, headache, neck stiffness, chest pains, abdominal pains, nausea, vomiting, diarrhea, dysuria or change in urinary habits.  Temp 103 reported orally at home.   History of prior UTIs.  Vaccinated against COVID, did have lunch with a group of seniors yesterday, no one reportedly sick.    Exam:  General: Patient well appearing, vital signs within normal limits  HEENT: airway patent with moist mucous membranes  Cardiac: RRR S1/S2 with strong peripheral pulses  Respiratory: lungs clear without respiratory distress  GI: abdomen soft, non tender, non distended  Neuro: no gross neurologic deficits  Skin: warm, well perfused  Psych: normal mood and affect    Patient presenting with fever/malaise at home without associated symptoms and non focal exam.  Will evaluate for possible sources of infection in Emergency Department.  Currently very well appearing, may not require admission for treatment if bacterial infection suspected unless significantly abnormal findings on workup.  Fabiano Mensah M.D.

## 2021-07-31 VITALS
RESPIRATION RATE: 15 BRPM | TEMPERATURE: 98 F | OXYGEN SATURATION: 100 % | DIASTOLIC BLOOD PRESSURE: 68 MMHG | HEART RATE: 70 BPM | SYSTOLIC BLOOD PRESSURE: 110 MMHG

## 2021-07-31 NOTE — ED ADULT NURSE NOTE - NSIMPLEMENTINTERV_GEN_ALL_ED
Implemented All Fall with Harm Risk Interventions:  [place] to call system. Call bell, personal items and telephone within reach. Instruct patient to call for assistance. Room bathroom lighting operational. Non-slip footwear when patient is off stretcher. Physically safe environment: no spills, clutter or unnecessary equipment. Stretcher in lowest position, wheels locked, appropriate side rails in place. Provide visual cue, wrist band, yellow gown, etc. Monitor gait and stability. Monitor for mental status changes and reorient to person, place, and time. Review medications for side effects contributing to fall risk. Reinforce activity limits and safety measures with patient and family. Provide visual clues: red socks.

## 2021-07-31 NOTE — ED ADULT NURSE NOTE - OBJECTIVE STATEMENT
89 yo male with pmh CAD, Afib on eliquis, HTN presents to ED c/o weakness and fatigue x 1 day. Pt states temperature at home was 103. Pt states he had a luncheon yesterday and states "I don't know if I was exposed to anyone sick." Pt states he is fully vaccinated. Pt states he has increased urinary frequency and had a UTI last month. Pt denies CP, SOB, h/a, dizziness, weakness, numbness/tingling, chills, abdominal pain, n/v/d, hematuria, burning with urination. UPon assessment, pt a&ox3, nad, breathing spontaneous and nonlabored, able to move all extremities and follow all commands, sensation intact. Pt paced rhythm on cardiac monitor. Pt safety and comfort provided.

## 2021-08-04 LAB
CULTURE RESULTS: SIGNIFICANT CHANGE UP
CULTURE RESULTS: SIGNIFICANT CHANGE UP
SPECIMEN SOURCE: SIGNIFICANT CHANGE UP
SPECIMEN SOURCE: SIGNIFICANT CHANGE UP

## 2021-08-19 ENCOUNTER — TRANSCRIPTION ENCOUNTER (OUTPATIENT)
Age: 86
End: 2021-08-19

## 2021-08-21 ENCOUNTER — TRANSCRIPTION ENCOUNTER (OUTPATIENT)
Age: 86
End: 2021-08-21

## 2021-08-31 ENCOUNTER — APPOINTMENT (OUTPATIENT)
Dept: UROLOGY | Facility: CLINIC | Age: 86
End: 2021-08-31
Payer: MEDICARE

## 2021-08-31 DIAGNOSIS — R35.0 FREQUENCY OF MICTURITION: ICD-10-CM

## 2021-08-31 DIAGNOSIS — N40.1 BENIGN PROSTATIC HYPERPLASIA WITH LOWER URINARY TRACT SYMPMS: ICD-10-CM

## 2021-08-31 DIAGNOSIS — N13.8 BENIGN PROSTATIC HYPERPLASIA WITH LOWER URINARY TRACT SYMPMS: ICD-10-CM

## 2021-08-31 PROCEDURE — 99213 OFFICE O/P EST LOW 20 MIN: CPT

## 2021-09-01 LAB
APPEARANCE: CLEAR
BACTERIA: NEGATIVE
BILIRUBIN URINE: NEGATIVE
BLOOD URINE: NEGATIVE
COLOR: YELLOW
GLUCOSE QUALITATIVE U: NEGATIVE
HYALINE CASTS: 0 /LPF
KETONES URINE: NEGATIVE
LEUKOCYTE ESTERASE URINE: NEGATIVE
MICROSCOPIC-UA: NORMAL
NITRITE URINE: NEGATIVE
PH URINE: 5.5
PROTEIN URINE: NEGATIVE
RED BLOOD CELLS URINE: 0 /HPF
SPECIFIC GRAVITY URINE: 1.02
SQUAMOUS EPITHELIAL CELLS: 0 /HPF
UROBILINOGEN URINE: NORMAL
WHITE BLOOD CELLS URINE: 1 /HPF

## 2021-09-04 LAB — BACTERIA UR CULT: ABNORMAL

## 2022-07-01 NOTE — HISTORY OF PRESENT ILLNESS
[FreeTextEntry1] : 89-year-old male with a history of CAD, paroxysmal atrial fibrillation, hypertension, and hypercholesterolemia.  He has no palpitations or other cardiac symptoms. His mobility is poor from his spinal stenosis and he tripped and hit his head about 6 months ago. Since then he's had several near syncopal episodes, although he has not lost consciousness. No

## 2025-03-12 NOTE — DISCHARGE NOTE ADULT - ADDITIONAL INSTRUCTIONS
Follow up with Cardiologist in 7 days  - Schedule outpatient Echo with Cardiologist Patient/Caregiver provided printed discharge information.